# Patient Record
Sex: MALE | Race: WHITE | NOT HISPANIC OR LATINO | Employment: STUDENT | URBAN - METROPOLITAN AREA
[De-identification: names, ages, dates, MRNs, and addresses within clinical notes are randomized per-mention and may not be internally consistent; named-entity substitution may affect disease eponyms.]

---

## 2018-07-09 ENCOUNTER — NON-PROVIDER VISIT (OUTPATIENT)
Dept: OCCUPATIONAL MEDICINE | Facility: CLINIC | Age: 23
End: 2018-07-09

## 2018-07-09 DIAGNOSIS — Z02.1 PRE-EMPLOYMENT DRUG SCREENING: ICD-10-CM

## 2018-07-09 DIAGNOSIS — Z02.89 ENCOUNTER FOR OCCUPATIONAL HEALTH ASSESSMENT: ICD-10-CM

## 2018-07-09 LAB
AMP AMPHETAMINE: NORMAL
BAR BARBITURATES: NORMAL
BZO BENZODIAZEPINES: NORMAL
COC COCAINE: NORMAL
INT CON NEG: NORMAL
INT CON POS: NORMAL
MDMA ECSTASY: NORMAL
MET METHAMPHETAMINES: NORMAL
MTD METHADONE: NORMAL
OPI OPIATES: NORMAL
OXY OXYCODONE: NORMAL
PCP PHENCYCLIDINE: NORMAL
POC URINE DRUG SCREEN OCDRS: NORMAL
THC: NORMAL

## 2018-07-09 PROCEDURE — 80305 DRUG TEST PRSMV DIR OPT OBS: CPT | Performed by: PREVENTIVE MEDICINE

## 2018-07-09 PROCEDURE — 86580 TB INTRADERMAL TEST: CPT | Performed by: PREVENTIVE MEDICINE

## 2018-07-11 ENCOUNTER — NON-PROVIDER VISIT (OUTPATIENT)
Dept: OCCUPATIONAL MEDICINE | Facility: CLINIC | Age: 23
End: 2018-07-11

## 2018-07-11 ENCOUNTER — APPOINTMENT (OUTPATIENT)
Dept: RADIOLOGY | Facility: IMAGING CENTER | Age: 23
End: 2018-07-11
Attending: PREVENTIVE MEDICINE
Payer: COMMERCIAL

## 2018-07-11 ENCOUNTER — OFFICE VISIT (OUTPATIENT)
Dept: OCCUPATIONAL MEDICINE | Facility: CLINIC | Age: 23
End: 2018-07-11

## 2018-07-11 DIAGNOSIS — R76.11 POSITIVE PPD: ICD-10-CM

## 2018-07-11 LAB — TB WHEAL 3D P 5 TU DIAM: NORMAL MM

## 2018-07-11 PROCEDURE — 99211 OFF/OP EST MAY X REQ PHY/QHP: CPT | Performed by: PREVENTIVE MEDICINE

## 2018-07-11 PROCEDURE — 71045 X-RAY EXAM CHEST 1 VIEW: CPT | Mod: TC | Performed by: PREVENTIVE MEDICINE

## 2018-10-01 ENCOUNTER — IMMUNIZATION (OUTPATIENT)
Dept: OCCUPATIONAL MEDICINE | Facility: CLINIC | Age: 23
End: 2018-10-01

## 2018-10-01 DIAGNOSIS — Z23 NEED FOR VACCINATION: ICD-10-CM

## 2018-10-01 PROCEDURE — 90686 IIV4 VACC NO PRSV 0.5 ML IM: CPT | Performed by: PREVENTIVE MEDICINE

## 2019-09-30 ENCOUNTER — IMMUNIZATION (OUTPATIENT)
Dept: OCCUPATIONAL MEDICINE | Facility: CLINIC | Age: 24
End: 2019-09-30

## 2019-09-30 DIAGNOSIS — Z23 NEED FOR VACCINATION: ICD-10-CM

## 2019-09-30 PROCEDURE — 90686 IIV4 VACC NO PRSV 0.5 ML IM: CPT | Performed by: PREVENTIVE MEDICINE

## 2019-11-30 ENCOUNTER — APPOINTMENT (OUTPATIENT)
Dept: RADIOLOGY | Facility: MEDICAL CENTER | Age: 24
End: 2019-11-30
Attending: EMERGENCY MEDICINE
Payer: COMMERCIAL

## 2019-11-30 ENCOUNTER — HOSPITAL ENCOUNTER (EMERGENCY)
Facility: MEDICAL CENTER | Age: 24
End: 2019-11-30
Attending: EMERGENCY MEDICINE
Payer: COMMERCIAL

## 2019-11-30 VITALS
WEIGHT: 156.53 LBS | TEMPERATURE: 97.7 F | BODY MASS INDEX: 20.09 KG/M2 | SYSTOLIC BLOOD PRESSURE: 136 MMHG | OXYGEN SATURATION: 100 % | RESPIRATION RATE: 22 BRPM | DIASTOLIC BLOOD PRESSURE: 82 MMHG | HEIGHT: 74 IN | HEART RATE: 84 BPM

## 2019-11-30 DIAGNOSIS — N20.1 URETEROLITHIASIS: ICD-10-CM

## 2019-11-30 LAB
ALBUMIN SERPL BCP-MCNC: 5.1 G/DL (ref 3.2–4.9)
ALBUMIN/GLOB SERPL: 2.1 G/DL
ALP SERPL-CCNC: 60 U/L (ref 30–99)
ALT SERPL-CCNC: 10 U/L (ref 2–50)
ANION GAP SERPL CALC-SCNC: 14 MMOL/L (ref 0–11.9)
APPEARANCE UR: CLEAR
AST SERPL-CCNC: 13 U/L (ref 12–45)
BACTERIA #/AREA URNS HPF: NEGATIVE /HPF
BASOPHILS # BLD AUTO: 0.3 % (ref 0–1.8)
BASOPHILS # BLD: 0.03 K/UL (ref 0–0.12)
BILIRUB SERPL-MCNC: 2.1 MG/DL (ref 0.1–1.5)
BILIRUB UR QL STRIP.AUTO: ABNORMAL
BUN SERPL-MCNC: 15 MG/DL (ref 8–22)
CALCIUM SERPL-MCNC: 10 MG/DL (ref 8.5–10.5)
CHLORIDE SERPL-SCNC: 106 MMOL/L (ref 96–112)
CO2 SERPL-SCNC: 22 MMOL/L (ref 20–33)
COLOR UR: ABNORMAL
CREAT SERPL-MCNC: 1.28 MG/DL (ref 0.5–1.4)
EOSINOPHIL # BLD AUTO: 0.02 K/UL (ref 0–0.51)
EOSINOPHIL NFR BLD: 0.2 % (ref 0–6.9)
EPI CELLS #/AREA URNS HPF: NEGATIVE /HPF
ERYTHROCYTE [DISTWIDTH] IN BLOOD BY AUTOMATED COUNT: 38.1 FL (ref 35.9–50)
GLOBULIN SER CALC-MCNC: 2.4 G/DL (ref 1.9–3.5)
GLUCOSE SERPL-MCNC: 93 MG/DL (ref 65–99)
GLUCOSE UR STRIP.AUTO-MCNC: NEGATIVE MG/DL
HCT VFR BLD AUTO: 47.3 % (ref 42–52)
HGB BLD-MCNC: 16.1 G/DL (ref 14–18)
HYALINE CASTS #/AREA URNS LPF: ABNORMAL /LPF
IMM GRANULOCYTES # BLD AUTO: 0.03 K/UL (ref 0–0.11)
IMM GRANULOCYTES NFR BLD AUTO: 0.3 % (ref 0–0.9)
KETONES UR STRIP.AUTO-MCNC: >=160 MG/DL
LEUKOCYTE ESTERASE UR QL STRIP.AUTO: ABNORMAL
LIPASE SERPL-CCNC: 14 U/L (ref 11–82)
LYMPHOCYTES # BLD AUTO: 1.08 K/UL (ref 1–4.8)
LYMPHOCYTES NFR BLD: 11.2 % (ref 22–41)
MCH RBC QN AUTO: 29.1 PG (ref 27–33)
MCHC RBC AUTO-ENTMCNC: 34 G/DL (ref 33.7–35.3)
MCV RBC AUTO: 85.5 FL (ref 81.4–97.8)
MICRO URNS: ABNORMAL
MONOCYTES # BLD AUTO: 0.75 K/UL (ref 0–0.85)
MONOCYTES NFR BLD AUTO: 7.8 % (ref 0–13.4)
NEUTROPHILS # BLD AUTO: 7.74 K/UL (ref 1.82–7.42)
NEUTROPHILS NFR BLD: 80.2 % (ref 44–72)
NITRITE UR QL STRIP.AUTO: NEGATIVE
NRBC # BLD AUTO: 0 K/UL
NRBC BLD-RTO: 0 /100 WBC
PH UR STRIP.AUTO: 7 [PH] (ref 5–8)
PLATELET # BLD AUTO: 263 K/UL (ref 164–446)
PMV BLD AUTO: 10.6 FL (ref 9–12.9)
POTASSIUM SERPL-SCNC: 3.3 MMOL/L (ref 3.6–5.5)
PROT SERPL-MCNC: 7.5 G/DL (ref 6–8.2)
PROT UR QL STRIP: 100 MG/DL
RBC # BLD AUTO: 5.53 M/UL (ref 4.7–6.1)
RBC # URNS HPF: ABNORMAL /HPF
RBC UR QL AUTO: ABNORMAL
SODIUM SERPL-SCNC: 142 MMOL/L (ref 135–145)
SP GR UR STRIP.AUTO: 1.04
UROBILINOGEN UR STRIP.AUTO-MCNC: 1 MG/DL
WBC # BLD AUTO: 9.7 K/UL (ref 4.8–10.8)
WBC #/AREA URNS HPF: ABNORMAL /HPF

## 2019-11-30 PROCEDURE — 700102 HCHG RX REV CODE 250 W/ 637 OVERRIDE(OP): Performed by: EMERGENCY MEDICINE

## 2019-11-30 PROCEDURE — 700111 HCHG RX REV CODE 636 W/ 250 OVERRIDE (IP): Performed by: EMERGENCY MEDICINE

## 2019-11-30 PROCEDURE — 700105 HCHG RX REV CODE 258: Performed by: EMERGENCY MEDICINE

## 2019-11-30 PROCEDURE — A9270 NON-COVERED ITEM OR SERVICE: HCPCS | Performed by: EMERGENCY MEDICINE

## 2019-11-30 PROCEDURE — 81001 URINALYSIS AUTO W/SCOPE: CPT

## 2019-11-30 PROCEDURE — 80053 COMPREHEN METABOLIC PANEL: CPT

## 2019-11-30 PROCEDURE — 96365 THER/PROPH/DIAG IV INF INIT: CPT

## 2019-11-30 PROCEDURE — 85025 COMPLETE CBC W/AUTO DIFF WBC: CPT

## 2019-11-30 PROCEDURE — 99285 EMERGENCY DEPT VISIT HI MDM: CPT

## 2019-11-30 PROCEDURE — 83690 ASSAY OF LIPASE: CPT

## 2019-11-30 PROCEDURE — 74176 CT ABD & PELVIS W/O CONTRAST: CPT

## 2019-11-30 PROCEDURE — 96375 TX/PRO/DX INJ NEW DRUG ADDON: CPT

## 2019-11-30 RX ORDER — TAMSULOSIN HYDROCHLORIDE 0.4 MG/1
0.4 CAPSULE ORAL DAILY
Qty: 14 CAP | Refills: 0 | Status: SHIPPED | OUTPATIENT
Start: 2019-11-30 | End: 2019-12-14

## 2019-11-30 RX ORDER — NAPROXEN 500 MG/1
500 TABLET ORAL 2 TIMES DAILY PRN
Qty: 14 TAB | Refills: 0 | Status: SHIPPED | OUTPATIENT
Start: 2019-11-30 | End: 2019-12-07

## 2019-11-30 RX ORDER — TAMSULOSIN HYDROCHLORIDE 0.4 MG/1
0.4 CAPSULE ORAL DAILY
Qty: 14 CAP | Refills: 0 | Status: SHIPPED | OUTPATIENT
Start: 2019-11-30 | End: 2019-11-30 | Stop reason: SDUPTHER

## 2019-11-30 RX ORDER — NAPROXEN 500 MG/1
500 TABLET ORAL 2 TIMES DAILY PRN
Qty: 14 TAB | Refills: 0 | Status: SHIPPED | OUTPATIENT
Start: 2019-11-30 | End: 2019-11-30 | Stop reason: SDUPTHER

## 2019-11-30 RX ORDER — ONDANSETRON 4 MG/1
4 TABLET, ORALLY DISINTEGRATING ORAL EVERY 8 HOURS PRN
Qty: 8 TAB | Refills: 0 | Status: ON HOLD | OUTPATIENT
Start: 2019-11-30 | End: 2020-07-03

## 2019-11-30 RX ORDER — MORPHINE SULFATE 4 MG/ML
4 INJECTION, SOLUTION INTRAMUSCULAR; INTRAVENOUS ONCE
Status: COMPLETED | OUTPATIENT
Start: 2019-11-30 | End: 2019-11-30

## 2019-11-30 RX ORDER — HYDROCODONE BITARTRATE AND ACETAMINOPHEN 5; 325 MG/1; MG/1
1 TABLET ORAL EVERY 4 HOURS PRN
Qty: 12 TAB | Refills: 0 | Status: SHIPPED | OUTPATIENT
Start: 2019-11-30 | End: 2019-12-03

## 2019-11-30 RX ORDER — ONDANSETRON 4 MG/1
4 TABLET, ORALLY DISINTEGRATING ORAL EVERY 8 HOURS PRN
Qty: 8 TAB | Refills: 0 | Status: SHIPPED | OUTPATIENT
Start: 2019-11-30 | End: 2019-11-30 | Stop reason: SDUPTHER

## 2019-11-30 RX ORDER — HYDROCODONE BITARTRATE AND ACETAMINOPHEN 5; 325 MG/1; MG/1
1 TABLET ORAL EVERY 4 HOURS PRN
Qty: 12 TAB | Refills: 0 | Status: SHIPPED | OUTPATIENT
Start: 2019-11-30 | End: 2019-11-30 | Stop reason: SDUPTHER

## 2019-11-30 RX ORDER — CEPHALEXIN 500 MG/1
1000 CAPSULE ORAL 2 TIMES DAILY
Qty: 28 CAP | Refills: 0 | Status: SHIPPED | OUTPATIENT
Start: 2019-11-30 | End: 2019-11-30 | Stop reason: SDUPTHER

## 2019-11-30 RX ORDER — ACETAMINOPHEN 325 MG/1
650 TABLET ORAL ONCE
Status: COMPLETED | OUTPATIENT
Start: 2019-11-30 | End: 2019-11-30

## 2019-11-30 RX ORDER — CEPHALEXIN 500 MG/1
1000 CAPSULE ORAL 2 TIMES DAILY
Qty: 28 CAP | Refills: 0 | Status: SHIPPED | OUTPATIENT
Start: 2019-11-30 | End: 2019-12-07

## 2019-11-30 RX ADMIN — ACETAMINOPHEN 650 MG: 325 TABLET, FILM COATED ORAL at 14:56

## 2019-11-30 RX ADMIN — CEFTRIAXONE SODIUM 2 G: 2 INJECTION, POWDER, FOR SOLUTION INTRAMUSCULAR; INTRAVENOUS at 17:15

## 2019-11-30 RX ADMIN — MORPHINE SULFATE 4 MG: 4 INJECTION INTRAVENOUS at 17:21

## 2019-11-30 NOTE — ED PROVIDER NOTES
ED Provider Note    Scribed for Nathan Jara M.D. by Huma Jorgensen. 11/30/2019, 2:40 PM.    Primary care provider: Pcp Pt States None  Means of arrival: Walk in  History obtained from: Patient   History limited by: None    CHIEF COMPLAINT  Chief Complaint   Patient presents with   • Flank Pain       HPI  Two EDBran is a 24 y.o. male who presents to the Emergency Department for evaluation of right abdominal pain that radiates to the right flank that began today at 10:30 AM. At onset pain was 8/10 but at time of exam patient describes pain as 4/10. Patient reports he woke up with the pain and tried to use the restroom to no alleviation of the pain. He reports the urine was very dark but does not know if there was blood. He tried to take a warm shower for his pain but experienced sensations of tingling in his abdomen bilateral arms and hands and muscles twitching around his eyes. Patient complains of additional symptoms of nausea. He denies any fever. Patient reports an identical episode of pain on Monday but those symptoms were alleviated with 800 mg ibuprofen. He denies a history of kidney stones.     REVIEW OF SYSTEMS  Pertinent positives include abdominal pain, right flank pain, dark urine, sensations of tingling in his abdomen bilateral arms and hands and muscles twitching around his eyes, nausea. Pertinent negatives include no fever.  All other systems reviewed and negative.    PAST MEDICAL HISTORY   No pertinent history noted.     SURGICAL HISTORY  patient denies any surgical history    SOCIAL HISTORY  Social History     Tobacco Use   • Smoking status: Never Smoker   • Smokeless tobacco: Never Used   Substance Use Topics   • Alcohol use: Not Currently   • Drug use: Never      Social History     Substance and Sexual Activity   Drug Use Never       FAMILY HISTORY  History reviewed. No pertinent family history.    CURRENT MEDICATIONS  Home Medications     Reviewed by Mary Anne Kuhn R.N. (Registered Nurse) on  "11/30/19 at 1307  Med List Status: Partial   Medication Last Dose Status        Patient Antoni Taking any Medications                       ALLERGIES  No Known Allergies    PHYSICAL EXAM  VITAL SIGNS: /82   Pulse 84   Temp 36.5 °C (97.7 °F) (Oral)   Resp (!) 22   Ht 1.88 m (6' 2\")   Wt 71 kg (156 lb 8.4 oz)   SpO2 100%   BMI 20.10 kg/m²     Constitutional: Well developed, Well nourished, No acute distress, Non-toxic appearance.   HENT: Normocephalic, Atraumatic, TMs normal, mucous membranes moist, no erythema, exudates, swelling, or masses, nares patent  Eyes: nonicteric  Neck: Supple, no meningismus  Lymphatic: No lymphadenopathy noted.   Cardiovascular: Regular rate and rhythm, no gallops rubs or murmurs  Lungs: Clear bilaterally   Abdomen: Bowel sounds normal, Soft, No tenderness  Skin: Warm, Dry, no rash  Back: No tenderness, No CVA tenderness.   Genitalia: Testicles normal in size and shape, no tenderness, erythema, or discharge.   Rectal: Deferred  Extremities: No edema  Neurologic: Alert, appropriate, follows commands, moving all extremities, normal speech   Psychiatric: Affect normal    DIAGNOSTIC STUDIES / PROCEDURES    LABS  Results for orders placed or performed during the hospital encounter of 11/30/19   CBC WITH DIFFERENTIAL   Result Value Ref Range    WBC 9.7 4.8 - 10.8 K/uL    RBC 5.53 4.70 - 6.10 M/uL    Hemoglobin 16.1 14.0 - 18.0 g/dL    Hematocrit 47.3 42.0 - 52.0 %    MCV 85.5 81.4 - 97.8 fL    MCH 29.1 27.0 - 33.0 pg    MCHC 34.0 33.7 - 35.3 g/dL    RDW 38.1 35.9 - 50.0 fL    Platelet Count 263 164 - 446 K/uL    MPV 10.6 9.0 - 12.9 fL    Neutrophils-Polys 80.20 (H) 44.00 - 72.00 %    Lymphocytes 11.20 (L) 22.00 - 41.00 %    Monocytes 7.80 0.00 - 13.40 %    Eosinophils 0.20 0.00 - 6.90 %    Basophils 0.30 0.00 - 1.80 %    Immature Granulocytes 0.30 0.00 - 0.90 %    Nucleated RBC 0.00 /100 WBC    Neutrophils (Absolute) 7.74 (H) 1.82 - 7.42 K/uL    Lymphs (Absolute) 1.08 1.00 - 4.80 " K/uL    Monos (Absolute) 0.75 0.00 - 0.85 K/uL    Eos (Absolute) 0.02 0.00 - 0.51 K/uL    Baso (Absolute) 0.03 0.00 - 0.12 K/uL    Immature Granulocytes (abs) 0.03 0.00 - 0.11 K/uL    NRBC (Absolute) 0.00 K/uL   COMP METABOLIC PANEL   Result Value Ref Range    Sodium 142 135 - 145 mmol/L    Potassium 3.3 (L) 3.6 - 5.5 mmol/L    Chloride 106 96 - 112 mmol/L    Co2 22 20 - 33 mmol/L    Anion Gap 14.0 (H) 0.0 - 11.9    Glucose 93 65 - 99 mg/dL    Bun 15 8 - 22 mg/dL    Creatinine 1.28 0.50 - 1.40 mg/dL    Calcium 10.0 8.5 - 10.5 mg/dL    AST(SGOT) 13 12 - 45 U/L    ALT(SGPT) 10 2 - 50 U/L    Alkaline Phosphatase 60 30 - 99 U/L    Total Bilirubin 2.1 (H) 0.1 - 1.5 mg/dL    Albumin 5.1 (H) 3.2 - 4.9 g/dL    Total Protein 7.5 6.0 - 8.2 g/dL    Globulin 2.4 1.9 - 3.5 g/dL    A-G Ratio 2.1 g/dL   LIPASE   Result Value Ref Range    Lipase 14 11 - 82 U/L   URINALYSIS,CULTURE IF INDICATED   Result Value Ref Range    Color DK Yellow     Character Clear     Specific Gravity 1.037 <1.035    Ph 7.0 5.0 - 8.0    Glucose Negative Negative mg/dL    Ketones >=160 Negative mg/dL    Protein 100 (A) Negative mg/dL    Bilirubin Small (A) Negative    Urobilinogen, Urine 1.0 Negative    Nitrite Negative Negative    Leukocyte Esterase Trace (A) Negative    Occult Blood Moderate (A) Negative    Micro Urine Req Microscopic    ESTIMATED GFR   Result Value Ref Range    GFR If African American >60 >60 mL/min/1.73 m 2    GFR If Non African American >60 >60 mL/min/1.73 m 2   URINE MICROSCOPIC (W/UA)   Result Value Ref Range    WBC 2-5 (A) /hpf    RBC  (A) /hpf    Bacteria Negative None /hpf    Epithelial Cells Negative /hpf    Hyaline Cast 3-5 (A) /lpf     All labs reviewed by me.       RADIOLOGY  CT-RENAL COLIC EVALUATION(A/P W/O)   Final Result      5 mm right mid to proximal ureteral stone with mild hydronephrosis seen above that level.        The radiologist's interpretation of all radiological studies have been reviewed by me.    COURSE  & MEDICAL DECISION MAKING  Nursing notes, VS, PMSFHx reviewed in chart.     2:40 PM Patient seen and examined at bedside. Ordered for CT Renal colic, Estimated GFR, CBC w/ differentials, CMP, Lipase, Urinalysis to evaluate. Patient was treated with tylenol 650 mg for his symptoms.     4:24 PM Patient was reevaluated at bedside. Discussed lab and radiology  results with the patient and informed them that he has a kidney stone. I let him know I will have urology follow up with him.      5:05 PM Paged Urology.     5:09 PM I discussed the patient's case and the above findings with Dr. Jerez (Urology) who agreed to schedule a follow up with the patient.     5:10 PM Informed patient that Dr. Jerez agreed to follow up with him. Informed him I will prescribe him pain medication and Zofran for his symptoms and that he may be discharged at this time. Patient verbalize agreement to the plan for care.       Decision Making:  This is a 24 y.o. year old male who presents with right abdominal and flank pain.  The patient has evidence of a proximal right 5 mm ureteral stone with moderate hydro-.  Patient has a few whites in the urine but predominately red cells and no bacteria.  I will cover with antibiotics.  The case was discussed with Dr. Jerez who is on-call for urology and will follow up with the patient early this week.  Otherwise the patient appears very comfortable and nontoxic.  Patient has no leukocytosis and is afebrile.    I reviewed prescription monitoring program for patient's narcotic use before prescribing a scheduled drug.The patient will not drink alcohol nor drive with prescribed medications. The patient will return for new or worsening symptoms and is stable at the time of discharge.    The patient is referred to a primary physician for blood pressure management, diabetic screening, and for all other preventative health concerns.    In prescribing controlled substances to this patient, I certify that I have  obtained and reviewed the medical history of Two EDArion. I have also made a good ricardo effort to obtain applicable records from other providers who have treated the patient and records did not demonstrate any increased risk of substance abuse that would prevent me from prescribing controlled substances.     I have conducted a physical exam and documented it. I have reviewed Mr. Zepeda’s prescription history as maintained by the [place] Prescription Monitoring Program.     I have assessed the patient’s risk for abuse, dependency, and addiction using the validated Opioid Risk Tool available at https://www.mdcalc.com/ghcfic-yuse-lxii-ort-narcotic-abuse.     Given the above, I believe the benefits of controlled substance therapy outweigh the risks. The reasons for prescribing controlled substances include non-narcotic, oral analgesic alternatives have been inadequate for pain control. Accordingly, I have discussed the risk and benefits, treatment plan, and alternative therapies with the patient.     DISPOSITION:  Patient will be discharged home in stable condition.    FOLLOW UP:  Bennett Jerez M.D.  [address] [place] [id number] [phone]    Schedule an appointment as soon as possible for a visit in 1 day        OUTPATIENT MEDICATIONS:  New Prescriptions    CEPHALEXIN (KEFLEX) 500 MG CAP    Take 2 Caps by mouth 2 times a day for 7 days.    HYDROCODONE-ACETAMINOPHEN (NORCO) 5-325 MG TAB PER TABLET    Take 1 Tab by mouth every four hours as needed for up to 3 days.    NAPROXEN (NAPROSYN) 500 MG TAB    Take 1 Tab by mouth 2 times a day as needed (nausea) for up to 7 days.    ONDANSETRON (ZOFRAN ODT) 4 MG TABLET DISPERSIBLE    Take 1 Tab by mouth every 8 hours as needed for Nausea.    TAMSULOSIN (FLOMAX) 0.4 MG CAPSULE    Take 1 Cap by mouth every day for 14 days.       DISPOSITION:  Patient will be discharged home in stable condition.    FOLLOW UP:  Bennett Jerez M.D.  [address]  [id number]  [phone]    Schedule an appointment as soon as possible for a visit in 1 day        OUTPATIENT MEDICATIONS:  New Prescriptions    CEPHALEXIN (KEFLEX) 500 MG CAP    Take 2 Caps by mouth 2 times a day for 7 days.    HYDROCODONE-ACETAMINOPHEN (NORCO) 5-325 MG TAB PER TABLET    Take 1 Tab by mouth every four hours as needed for up to 3 days.    NAPROXEN (NAPROSYN) 500 MG TAB    Take 1 Tab by mouth 2 times a day as needed (nausea) for up to 7 days.    ONDANSETRON (ZOFRAN ODT) 4 MG TABLET DISPERSIBLE    Take 1 Tab by mouth every 8 hours as needed for Nausea.    TAMSULOSIN (FLOMAX) 0.4 MG CAPSULE    Take 1 Cap by mouth every day for 14 days.       FINAL IMPRESSION  1. Ureterolithiasis          Huma MAGALLON (James), am scribing for, and in the presence of, Nathan Jara M.D..    Electronically signed by: Huma Mckeon), 11/30/2019    Nathan MAGALLON M.D. personally performed the services described in this documentation, as scribed by Huma Jorgensen in my presence, and it is both accurate and complete.    C    The note accurately reflects work and decisions made by me.  Nathan Jara  11/30/2019  5:18 PM

## 2019-11-30 NOTE — ED TRIAGE NOTES
Chief Complaint   Patient presents with   • Flank Pain   Pt to triage in [facility].  Pt reports acute onset of 9/10 right flank pain starting 1030 AM.  Pt additionally reports tingling in bilateral hands and eyes feel twitchy.  Pt educated on triage process and instructed to notify triage RN of any change in status.

## 2019-12-01 NOTE — ED NOTES
Discussed Discharge instructions, F/U instructions, and medication instructions with Pt. Rx given, controlled substance informed consent reviewed and signed, questions answered. Pt escorted out of ED in stable condition.

## 2019-12-01 NOTE — DISCHARGE INSTRUCTIONS
Return for progressive pain, any persistent vomiting fever or other concerns.  Please call on Monday for follow-up with urology.

## 2020-07-01 ENCOUNTER — HOSPITAL ENCOUNTER (INPATIENT)
Facility: MEDICAL CENTER | Age: 25
LOS: 1 days | DRG: 660 | End: 2020-07-03
Attending: EMERGENCY MEDICINE | Admitting: INTERNAL MEDICINE
Payer: COMMERCIAL

## 2020-07-01 DIAGNOSIS — N20.1 URETEROLITHIASIS: ICD-10-CM

## 2020-07-01 DIAGNOSIS — N20.0 RECURRENT KIDNEY STONES: ICD-10-CM

## 2020-07-01 PROCEDURE — 99285 EMERGENCY DEPT VISIT HI MDM: CPT

## 2020-07-01 PROCEDURE — 85025 COMPLETE CBC W/AUTO DIFF WBC: CPT

## 2020-07-01 PROCEDURE — 80053 COMPREHEN METABOLIC PANEL: CPT

## 2020-07-01 PROCEDURE — 83970 ASSAY OF PARATHORMONE: CPT

## 2020-07-01 PROCEDURE — 96375 TX/PRO/DX INJ NEW DRUG ADDON: CPT

## 2020-07-01 PROCEDURE — 82330 ASSAY OF CALCIUM: CPT

## 2020-07-01 PROCEDURE — 96374 THER/PROPH/DIAG INJ IV PUSH: CPT

## 2020-07-01 ASSESSMENT — FIBROSIS 4 INDEX: FIB4 SCORE: 0.36

## 2020-07-02 ENCOUNTER — ANESTHESIA (OUTPATIENT)
Dept: SURGERY | Facility: MEDICAL CENTER | Age: 25
DRG: 660 | End: 2020-07-02
Payer: COMMERCIAL

## 2020-07-02 ENCOUNTER — ANESTHESIA EVENT (OUTPATIENT)
Dept: SURGERY | Facility: MEDICAL CENTER | Age: 25
DRG: 660 | End: 2020-07-02
Payer: COMMERCIAL

## 2020-07-02 ENCOUNTER — APPOINTMENT (OUTPATIENT)
Dept: RADIOLOGY | Facility: MEDICAL CENTER | Age: 25
DRG: 660 | End: 2020-07-02
Attending: UROLOGY
Payer: COMMERCIAL

## 2020-07-02 ENCOUNTER — APPOINTMENT (OUTPATIENT)
Dept: RADIOLOGY | Facility: MEDICAL CENTER | Age: 25
DRG: 660 | End: 2020-07-02
Attending: EMERGENCY MEDICINE
Payer: COMMERCIAL

## 2020-07-02 PROBLEM — N13.2 HYDRONEPHROSIS WITH URINARY OBSTRUCTION DUE TO URETERAL CALCULUS: Status: ACTIVE | Noted: 2020-07-02

## 2020-07-02 PROBLEM — E87.1 HYPONATREMIA: Status: ACTIVE | Noted: 2020-07-02

## 2020-07-02 LAB
ALBUMIN SERPL BCP-MCNC: 4.7 G/DL (ref 3.2–4.9)
ALBUMIN/GLOB SERPL: 1.7 G/DL
ALP SERPL-CCNC: 92 U/L (ref 30–99)
ALT SERPL-CCNC: 11 U/L (ref 2–50)
AMORPH CRY #/AREA URNS HPF: PRESENT /HPF
ANION GAP SERPL CALC-SCNC: 13 MMOL/L (ref 7–16)
APPEARANCE UR: ABNORMAL
AST SERPL-CCNC: 22 U/L (ref 12–45)
BASOPHILS # BLD AUTO: 0.3 % (ref 0–1.8)
BASOPHILS # BLD: 0.03 K/UL (ref 0–0.12)
BILIRUB SERPL-MCNC: 0.6 MG/DL (ref 0.1–1.5)
BILIRUB UR QL STRIP.AUTO: ABNORMAL
BUN SERPL-MCNC: 14 MG/DL (ref 8–22)
CA-I SERPL-SCNC: 1.1 MMOL/L (ref 1.1–1.3)
CALCIUM SERPL-MCNC: 9.1 MG/DL (ref 8.5–10.5)
CHLORIDE SERPL-SCNC: 99 MMOL/L (ref 96–112)
CO2 SERPL-SCNC: 22 MMOL/L (ref 20–33)
COLOR UR: YELLOW
COVID ORDER STATUS COVID19: NORMAL
CREAT SERPL-MCNC: 1.35 MG/DL (ref 0.5–1.4)
EOSINOPHIL # BLD AUTO: 0.07 K/UL (ref 0–0.51)
EOSINOPHIL NFR BLD: 0.7 % (ref 0–6.9)
ERYTHROCYTE [DISTWIDTH] IN BLOOD BY AUTOMATED COUNT: 42.9 FL (ref 35.9–50)
GLOBULIN SER CALC-MCNC: 2.7 G/DL (ref 1.9–3.5)
GLUCOSE SERPL-MCNC: 107 MG/DL (ref 65–99)
GLUCOSE UR STRIP.AUTO-MCNC: NEGATIVE MG/DL
HCT VFR BLD AUTO: 47 % (ref 42–52)
HGB BLD-MCNC: 15.2 G/DL (ref 14–18)
IMM GRANULOCYTES # BLD AUTO: 0.02 K/UL (ref 0–0.11)
IMM GRANULOCYTES NFR BLD AUTO: 0.2 % (ref 0–0.9)
KETONES UR STRIP.AUTO-MCNC: ABNORMAL MG/DL
LEUKOCYTE ESTERASE UR QL STRIP.AUTO: NEGATIVE
LYMPHOCYTES # BLD AUTO: 1.51 K/UL (ref 1–4.8)
LYMPHOCYTES NFR BLD: 15.2 % (ref 22–41)
MCH RBC QN AUTO: 29.2 PG (ref 27–33)
MCHC RBC AUTO-ENTMCNC: 32.3 G/DL (ref 33.7–35.3)
MCV RBC AUTO: 90.4 FL (ref 81.4–97.8)
MICRO URNS: ABNORMAL
MONOCYTES # BLD AUTO: 0.59 K/UL (ref 0–0.85)
MONOCYTES NFR BLD AUTO: 5.9 % (ref 0–13.4)
NEUTROPHILS # BLD AUTO: 7.73 K/UL (ref 1.82–7.42)
NEUTROPHILS NFR BLD: 77.7 % (ref 44–72)
NITRITE UR QL STRIP.AUTO: NEGATIVE
NRBC # BLD AUTO: 0 K/UL
NRBC BLD-RTO: 0 /100 WBC
PH UR STRIP.AUTO: 6 [PH] (ref 5–8)
PLATELET # BLD AUTO: 251 K/UL (ref 164–446)
PMV BLD AUTO: 10.6 FL (ref 9–12.9)
POTASSIUM SERPL-SCNC: 3.7 MMOL/L (ref 3.6–5.5)
PROT SERPL-MCNC: 7.4 G/DL (ref 6–8.2)
PROT UR QL STRIP: 30 MG/DL
PTH-INTACT SERPL-MCNC: 48.7 PG/ML (ref 14–72)
RBC # BLD AUTO: 5.2 M/UL (ref 4.7–6.1)
RBC UR QL AUTO: ABNORMAL
SARS-COV-2 RDRP RESP QL NAA+PROBE: NOTDETECTED
SODIUM SERPL-SCNC: 134 MMOL/L (ref 135–145)
SP GR UR REFRACTOMETRY: 1.03
SPECIMEN SOURCE: NORMAL
UROBILINOGEN UR STRIP.AUTO-MCNC: 0.2 MG/DL
WBC # BLD AUTO: 10 K/UL (ref 4.8–10.8)

## 2020-07-02 PROCEDURE — 502240 HCHG MISC OR SUPPLY RC 0272: Performed by: UROLOGY

## 2020-07-02 PROCEDURE — 160009 HCHG ANES TIME/MIN: Performed by: UROLOGY

## 2020-07-02 PROCEDURE — A9270 NON-COVERED ITEM OR SERVICE: HCPCS | Performed by: UROLOGY

## 2020-07-02 PROCEDURE — 88300 SURGICAL PATH GROSS: CPT

## 2020-07-02 PROCEDURE — 160039 HCHG SURGERY MINUTES - EA ADDL 1 MIN LEVEL 3: Performed by: UROLOGY

## 2020-07-02 PROCEDURE — U0004 COV-19 TEST NON-CDC HGH THRU: HCPCS

## 2020-07-02 PROCEDURE — 99222 1ST HOSP IP/OBS MODERATE 55: CPT | Performed by: INTERNAL MEDICINE

## 2020-07-02 PROCEDURE — 160002 HCHG RECOVERY MINUTES (STAT): Performed by: UROLOGY

## 2020-07-02 PROCEDURE — 700105 HCHG RX REV CODE 258: Performed by: INTERNAL MEDICINE

## 2020-07-02 PROCEDURE — C9803 HOPD COVID-19 SPEC COLLECT: HCPCS | Performed by: PHYSICIAN ASSISTANT

## 2020-07-02 PROCEDURE — 700111 HCHG RX REV CODE 636 W/ 250 OVERRIDE (IP): Performed by: INTERNAL MEDICINE

## 2020-07-02 PROCEDURE — 160048 HCHG OR STATISTICAL LEVEL 1-5: Performed by: UROLOGY

## 2020-07-02 PROCEDURE — 700111 HCHG RX REV CODE 636 W/ 250 OVERRIDE (IP): Performed by: ANESTHESIOLOGY

## 2020-07-02 PROCEDURE — C2617 STENT, NON-COR, TEM W/O DEL: HCPCS | Performed by: UROLOGY

## 2020-07-02 PROCEDURE — A9270 NON-COVERED ITEM OR SERVICE: HCPCS | Performed by: STUDENT IN AN ORGANIZED HEALTH CARE EDUCATION/TRAINING PROGRAM

## 2020-07-02 PROCEDURE — 700102 HCHG RX REV CODE 250 W/ 637 OVERRIDE(OP): Performed by: UROLOGY

## 2020-07-02 PROCEDURE — 0TC68ZZ EXTIRPATION OF MATTER FROM RIGHT URETER, VIA NATURAL OR ARTIFICIAL OPENING ENDOSCOPIC: ICD-10-PCS | Performed by: UROLOGY

## 2020-07-02 PROCEDURE — BT1DZZZ FLUOROSCOPY OF RIGHT KIDNEY, URETER AND BLADDER: ICD-10-PCS | Performed by: RADIOLOGY

## 2020-07-02 PROCEDURE — 74176 CT ABD & PELVIS W/O CONTRAST: CPT

## 2020-07-02 PROCEDURE — C1769 GUIDE WIRE: HCPCS | Performed by: UROLOGY

## 2020-07-02 PROCEDURE — 160035 HCHG PACU - 1ST 60 MINS PHASE I: Performed by: UROLOGY

## 2020-07-02 PROCEDURE — 700117 HCHG RX CONTRAST REV CODE 255: Performed by: UROLOGY

## 2020-07-02 PROCEDURE — 81001 URINALYSIS AUTO W/SCOPE: CPT

## 2020-07-02 PROCEDURE — 700111 HCHG RX REV CODE 636 W/ 250 OVERRIDE (IP): Performed by: EMERGENCY MEDICINE

## 2020-07-02 PROCEDURE — 770006 HCHG ROOM/CARE - MED/SURG/GYN SEMI*

## 2020-07-02 PROCEDURE — 82365 CALCULUS SPECTROSCOPY: CPT

## 2020-07-02 PROCEDURE — 700101 HCHG RX REV CODE 250: Performed by: ANESTHESIOLOGY

## 2020-07-02 PROCEDURE — 700102 HCHG RX REV CODE 250 W/ 637 OVERRIDE(OP): Performed by: STUDENT IN AN ORGANIZED HEALTH CARE EDUCATION/TRAINING PROGRAM

## 2020-07-02 PROCEDURE — 0T768DZ DILATION OF RIGHT URETER WITH INTRALUMINAL DEVICE, VIA NATURAL OR ARTIFICIAL OPENING ENDOSCOPIC: ICD-10-PCS | Performed by: UROLOGY

## 2020-07-02 PROCEDURE — 501329 HCHG SET, CYSTO IRRIG Y TUR: Performed by: UROLOGY

## 2020-07-02 PROCEDURE — 160028 HCHG SURGERY MINUTES - 1ST 30 MINS LEVEL 3: Performed by: UROLOGY

## 2020-07-02 DEVICE — STENT UROLOGICAL POLARIS 6X26  ULTRA: Type: IMPLANTABLE DEVICE | Site: URETER | Status: FUNCTIONAL

## 2020-07-02 RX ORDER — ONDANSETRON 2 MG/ML
4 INJECTION INTRAMUSCULAR; INTRAVENOUS EVERY 4 HOURS PRN
Status: DISCONTINUED | OUTPATIENT
Start: 2020-07-02 | End: 2020-07-03 | Stop reason: HOSPADM

## 2020-07-02 RX ORDER — PHENAZOPYRIDINE HYDROCHLORIDE 100 MG/1
200 TABLET, FILM COATED ORAL
Status: DISCONTINUED | OUTPATIENT
Start: 2020-07-02 | End: 2020-07-03 | Stop reason: HOSPADM

## 2020-07-02 RX ORDER — LABETALOL HYDROCHLORIDE 5 MG/ML
5 INJECTION, SOLUTION INTRAVENOUS
Status: DISCONTINUED | OUTPATIENT
Start: 2020-07-02 | End: 2020-07-02 | Stop reason: HOSPADM

## 2020-07-02 RX ORDER — HYDROMORPHONE HYDROCHLORIDE 1 MG/ML
0.2 INJECTION, SOLUTION INTRAMUSCULAR; INTRAVENOUS; SUBCUTANEOUS
Status: DISCONTINUED | OUTPATIENT
Start: 2020-07-02 | End: 2020-07-02 | Stop reason: HOSPADM

## 2020-07-02 RX ORDER — KETOROLAC TROMETHAMINE 30 MG/ML
30 INJECTION, SOLUTION INTRAMUSCULAR; INTRAVENOUS EVERY 6 HOURS PRN
Status: DISCONTINUED | OUTPATIENT
Start: 2020-07-02 | End: 2020-07-03 | Stop reason: HOSPADM

## 2020-07-02 RX ORDER — HYDROMORPHONE HYDROCHLORIDE 1 MG/ML
0.1 INJECTION, SOLUTION INTRAMUSCULAR; INTRAVENOUS; SUBCUTANEOUS
Status: DISCONTINUED | OUTPATIENT
Start: 2020-07-02 | End: 2020-07-02 | Stop reason: HOSPADM

## 2020-07-02 RX ORDER — MORPHINE SULFATE 4 MG/ML
4 INJECTION, SOLUTION INTRAMUSCULAR; INTRAVENOUS
Status: DISCONTINUED | OUTPATIENT
Start: 2020-07-02 | End: 2020-07-03 | Stop reason: HOSPADM

## 2020-07-02 RX ORDER — SODIUM CHLORIDE 9 MG/ML
INJECTION, SOLUTION INTRAVENOUS CONTINUOUS
Status: DISCONTINUED | OUTPATIENT
Start: 2020-07-02 | End: 2020-07-03 | Stop reason: HOSPADM

## 2020-07-02 RX ORDER — CEFAZOLIN SODIUM 1 G/3ML
INJECTION, POWDER, FOR SOLUTION INTRAMUSCULAR; INTRAVENOUS PRN
Status: DISCONTINUED | OUTPATIENT
Start: 2020-07-02 | End: 2020-07-02 | Stop reason: SURG

## 2020-07-02 RX ORDER — POLYETHYLENE GLYCOL 3350 17 G/17G
1 POWDER, FOR SOLUTION ORAL
Status: DISCONTINUED | OUTPATIENT
Start: 2020-07-02 | End: 2020-07-03 | Stop reason: HOSPADM

## 2020-07-02 RX ORDER — TAMSULOSIN HYDROCHLORIDE 0.4 MG/1
0.4 CAPSULE ORAL ONCE
Status: COMPLETED | OUTPATIENT
Start: 2020-07-02 | End: 2020-07-02

## 2020-07-02 RX ORDER — PROCHLORPERAZINE EDISYLATE 5 MG/ML
5-10 INJECTION INTRAMUSCULAR; INTRAVENOUS EVERY 4 HOURS PRN
Status: DISCONTINUED | OUTPATIENT
Start: 2020-07-02 | End: 2020-07-03 | Stop reason: HOSPADM

## 2020-07-02 RX ORDER — HYDRALAZINE HYDROCHLORIDE 20 MG/ML
5 INJECTION INTRAMUSCULAR; INTRAVENOUS
Status: DISCONTINUED | OUTPATIENT
Start: 2020-07-02 | End: 2020-07-02 | Stop reason: HOSPADM

## 2020-07-02 RX ORDER — DEXAMETHASONE SODIUM PHOSPHATE 4 MG/ML
INJECTION, SOLUTION INTRA-ARTICULAR; INTRALESIONAL; INTRAMUSCULAR; INTRAVENOUS; SOFT TISSUE PRN
Status: DISCONTINUED | OUTPATIENT
Start: 2020-07-02 | End: 2020-07-02 | Stop reason: SURG

## 2020-07-02 RX ORDER — DIPHENHYDRAMINE HYDROCHLORIDE 50 MG/ML
12.5 INJECTION INTRAMUSCULAR; INTRAVENOUS
Status: DISCONTINUED | OUTPATIENT
Start: 2020-07-02 | End: 2020-07-02 | Stop reason: HOSPADM

## 2020-07-02 RX ORDER — MORPHINE SULFATE 4 MG/ML
4 INJECTION, SOLUTION INTRAMUSCULAR; INTRAVENOUS ONCE
Status: COMPLETED | OUTPATIENT
Start: 2020-07-02 | End: 2020-07-02

## 2020-07-02 RX ORDER — BISACODYL 10 MG
10 SUPPOSITORY, RECTAL RECTAL
Status: DISCONTINUED | OUTPATIENT
Start: 2020-07-02 | End: 2020-07-03 | Stop reason: HOSPADM

## 2020-07-02 RX ORDER — PROMETHAZINE HYDROCHLORIDE 25 MG/1
12.5-25 SUPPOSITORY RECTAL EVERY 4 HOURS PRN
Status: DISCONTINUED | OUTPATIENT
Start: 2020-07-02 | End: 2020-07-03 | Stop reason: HOSPADM

## 2020-07-02 RX ORDER — HYDROMORPHONE HYDROCHLORIDE 1 MG/ML
0.4 INJECTION, SOLUTION INTRAMUSCULAR; INTRAVENOUS; SUBCUTANEOUS
Status: DISCONTINUED | OUTPATIENT
Start: 2020-07-02 | End: 2020-07-02 | Stop reason: HOSPADM

## 2020-07-02 RX ORDER — PROMETHAZINE HYDROCHLORIDE 25 MG/1
12.5-25 TABLET ORAL EVERY 4 HOURS PRN
Status: DISCONTINUED | OUTPATIENT
Start: 2020-07-02 | End: 2020-07-03 | Stop reason: HOSPADM

## 2020-07-02 RX ORDER — MEPERIDINE HYDROCHLORIDE 25 MG/ML
12.5 INJECTION INTRAMUSCULAR; INTRAVENOUS; SUBCUTANEOUS
Status: DISCONTINUED | OUTPATIENT
Start: 2020-07-02 | End: 2020-07-02 | Stop reason: HOSPADM

## 2020-07-02 RX ORDER — OXYCODONE HYDROCHLORIDE 5 MG/1
5 TABLET ORAL
Status: DISCONTINUED | OUTPATIENT
Start: 2020-07-02 | End: 2020-07-03 | Stop reason: HOSPADM

## 2020-07-02 RX ORDER — ONDANSETRON 2 MG/ML
4 INJECTION INTRAMUSCULAR; INTRAVENOUS ONCE
Status: COMPLETED | OUTPATIENT
Start: 2020-07-02 | End: 2020-07-02

## 2020-07-02 RX ORDER — AMOXICILLIN 250 MG
2 CAPSULE ORAL 2 TIMES DAILY
Status: DISCONTINUED | OUTPATIENT
Start: 2020-07-02 | End: 2020-07-03 | Stop reason: HOSPADM

## 2020-07-02 RX ORDER — OXYCODONE HCL 5 MG/5 ML
10 SOLUTION, ORAL ORAL
Status: DISCONTINUED | OUTPATIENT
Start: 2020-07-02 | End: 2020-07-02 | Stop reason: HOSPADM

## 2020-07-02 RX ORDER — SODIUM CHLORIDE, SODIUM LACTATE, POTASSIUM CHLORIDE, CALCIUM CHLORIDE 600; 310; 30; 20 MG/100ML; MG/100ML; MG/100ML; MG/100ML
INJECTION, SOLUTION INTRAVENOUS CONTINUOUS
Status: DISCONTINUED | OUTPATIENT
Start: 2020-07-02 | End: 2020-07-02 | Stop reason: HOSPADM

## 2020-07-02 RX ORDER — ACETAMINOPHEN 325 MG/1
650 TABLET ORAL EVERY 6 HOURS PRN
Status: DISCONTINUED | OUTPATIENT
Start: 2020-07-02 | End: 2020-07-03 | Stop reason: HOSPADM

## 2020-07-02 RX ORDER — ONDANSETRON 4 MG/1
4 TABLET, ORALLY DISINTEGRATING ORAL EVERY 4 HOURS PRN
Status: DISCONTINUED | OUTPATIENT
Start: 2020-07-02 | End: 2020-07-03 | Stop reason: HOSPADM

## 2020-07-02 RX ORDER — ONDANSETRON 2 MG/ML
4 INJECTION INTRAMUSCULAR; INTRAVENOUS
Status: DISCONTINUED | OUTPATIENT
Start: 2020-07-02 | End: 2020-07-02 | Stop reason: HOSPADM

## 2020-07-02 RX ORDER — OXYBUTYNIN CHLORIDE 10 MG/1
10 TABLET, EXTENDED RELEASE ORAL EVERY EVENING
Status: DISCONTINUED | OUTPATIENT
Start: 2020-07-02 | End: 2020-07-03 | Stop reason: HOSPADM

## 2020-07-02 RX ORDER — OXYCODONE HCL 5 MG/5 ML
5 SOLUTION, ORAL ORAL
Status: DISCONTINUED | OUTPATIENT
Start: 2020-07-02 | End: 2020-07-02 | Stop reason: HOSPADM

## 2020-07-02 RX ORDER — LIDOCAINE HYDROCHLORIDE 20 MG/ML
INJECTION, SOLUTION EPIDURAL; INFILTRATION; INTRACAUDAL; PERINEURAL PRN
Status: DISCONTINUED | OUTPATIENT
Start: 2020-07-02 | End: 2020-07-02 | Stop reason: SURG

## 2020-07-02 RX ORDER — KETOROLAC TROMETHAMINE 30 MG/ML
15 INJECTION, SOLUTION INTRAMUSCULAR; INTRAVENOUS ONCE
Status: COMPLETED | OUTPATIENT
Start: 2020-07-02 | End: 2020-07-02

## 2020-07-02 RX ORDER — HALOPERIDOL 5 MG/ML
1 INJECTION INTRAMUSCULAR
Status: DISCONTINUED | OUTPATIENT
Start: 2020-07-02 | End: 2020-07-02 | Stop reason: HOSPADM

## 2020-07-02 RX ORDER — OXYCODONE HYDROCHLORIDE 10 MG/1
10 TABLET ORAL
Status: DISCONTINUED | OUTPATIENT
Start: 2020-07-02 | End: 2020-07-03 | Stop reason: HOSPADM

## 2020-07-02 RX ADMIN — ONDANSETRON 4 MG: 2 INJECTION INTRAMUSCULAR; INTRAVENOUS at 00:24

## 2020-07-02 RX ADMIN — FENTANYL CITRATE 50 MCG: 50 INJECTION INTRAMUSCULAR; INTRAVENOUS at 21:23

## 2020-07-02 RX ADMIN — SODIUM CHLORIDE: 9 INJECTION, SOLUTION INTRAVENOUS at 11:52

## 2020-07-02 RX ADMIN — MORPHINE SULFATE 4 MG: 4 INJECTION INTRAVENOUS at 00:24

## 2020-07-02 RX ADMIN — SODIUM CHLORIDE: 9 INJECTION, SOLUTION INTRAVENOUS at 23:27

## 2020-07-02 RX ADMIN — FENTANYL CITRATE 50 MCG: 50 INJECTION INTRAMUSCULAR; INTRAVENOUS at 21:39

## 2020-07-02 RX ADMIN — TAMSULOSIN HYDROCHLORIDE 0.4 MG: 0.4 CAPSULE ORAL at 01:10

## 2020-07-02 RX ADMIN — DEXAMETHASONE SODIUM PHOSPHATE 8 MG: 4 INJECTION, SOLUTION INTRA-ARTICULAR; INTRALESIONAL; INTRAMUSCULAR; INTRAVENOUS; SOFT TISSUE at 21:14

## 2020-07-02 RX ADMIN — KETOROLAC TROMETHAMINE 15 MG: 30 INJECTION, SOLUTION INTRAMUSCULAR at 00:24

## 2020-07-02 RX ADMIN — KETOROLAC TROMETHAMINE 30 MG: 30 INJECTION, SOLUTION INTRAMUSCULAR at 23:37

## 2020-07-02 RX ADMIN — FENTANYL CITRATE 50 MCG: 50 INJECTION INTRAMUSCULAR; INTRAVENOUS at 21:46

## 2020-07-02 RX ADMIN — LIDOCAINE HYDROCHLORIDE 80 MG: 20 INJECTION, SOLUTION EPIDURAL; INFILTRATION; INTRACAUDAL at 21:08

## 2020-07-02 RX ADMIN — PHENAZOPYRIDINE HYDROCHLORIDE 200 MG: 100 TABLET ORAL at 23:37

## 2020-07-02 RX ADMIN — SODIUM CHLORIDE: 9 INJECTION, SOLUTION INTRAVENOUS at 03:13

## 2020-07-02 RX ADMIN — FENTANYL CITRATE 50 MCG: 50 INJECTION INTRAMUSCULAR; INTRAVENOUS at 21:06

## 2020-07-02 RX ADMIN — CEFAZOLIN 2 G: 330 INJECTION, POWDER, FOR SOLUTION INTRAMUSCULAR; INTRAVENOUS at 21:08

## 2020-07-02 RX ADMIN — KETOROLAC TROMETHAMINE 30 MG: 30 INJECTION, SOLUTION INTRAMUSCULAR at 15:27

## 2020-07-02 RX ADMIN — OXYBUTYNIN CHLORIDE 10 MG: 10 TABLET, EXTENDED RELEASE ORAL at 23:27

## 2020-07-02 RX ADMIN — PROPOFOL 200 MG: 10 INJECTION, EMULSION INTRAVENOUS at 21:08

## 2020-07-02 ASSESSMENT — LIFESTYLE VARIABLES
DOES PATIENT WANT TO STOP DRINKING: NO
EVER FELT BAD OR GUILTY ABOUT YOUR DRINKING: NO
HOW MANY TIMES IN THE PAST YEAR HAVE YOU HAD 5 OR MORE DRINKS IN A DAY: 5
AVERAGE NUMBER OF DAYS PER WEEK YOU HAVE A DRINK CONTAINING ALCOHOL: 1
EVER_SMOKED: NEVER
ON A TYPICAL DAY WHEN YOU DRINK ALCOHOL HOW MANY DRINKS DO YOU HAVE: 1
HAVE PEOPLE ANNOYED YOU BY CRITICIZING YOUR DRINKING: NO
ALCOHOL_USE: YES
HAVE YOU EVER FELT YOU SHOULD CUT DOWN ON YOUR DRINKING: NO
TOTAL SCORE: 0
EVER HAD A DRINK FIRST THING IN THE MORNING TO STEADY YOUR NERVES TO GET RID OF A HANGOVER: NO
CONSUMPTION TOTAL: POSITIVE
TOTAL SCORE: 0
TOTAL SCORE: 0

## 2020-07-02 ASSESSMENT — ENCOUNTER SYMPTOMS
BLOOD IN STOOL: 0
DIARRHEA: 0
BACK PAIN: 0
ABDOMINAL PAIN: 0
SHORTNESS OF BREATH: 0
MYALGIAS: 0
HEADACHES: 0
NAUSEA: 1
SEIZURES: 0
NECK PAIN: 0
DIZZINESS: 0
FLANK PAIN: 1
SORE THROAT: 0
FOCAL WEAKNESS: 0
CHILLS: 0
FEVER: 0
PALPITATIONS: 0
BRUISES/BLEEDS EASILY: 0
DIAPHORESIS: 0
COUGH: 0
SPUTUM PRODUCTION: 0
BLURRED VISION: 0
WHEEZING: 0
VOMITING: 0

## 2020-07-02 ASSESSMENT — COGNITIVE AND FUNCTIONAL STATUS - GENERAL
DAILY ACTIVITIY SCORE: 24
SUGGESTED CMS G CODE MODIFIER DAILY ACTIVITY: CH
SUGGESTED CMS G CODE MODIFIER MOBILITY: CH
MOBILITY SCORE: 24

## 2020-07-02 ASSESSMENT — COPD QUESTIONNAIRES
DO YOU EVER COUGH UP ANY MUCUS OR PHLEGM?: NO/ONLY WITH OCCASIONAL COLDS OR INFECTIONS
DURING THE PAST 4 WEEKS HOW MUCH DID YOU FEEL SHORT OF BREATH: NONE/LITTLE OF THE TIME
HAVE YOU SMOKED AT LEAST 100 CIGARETTES IN YOUR ENTIRE LIFE: NO/DON'T KNOW
COPD SCREENING SCORE: 0

## 2020-07-02 ASSESSMENT — PATIENT HEALTH QUESTIONNAIRE - PHQ9
SUM OF ALL RESPONSES TO PHQ9 QUESTIONS 1 AND 2: 0
2. FEELING DOWN, DEPRESSED, IRRITABLE, OR HOPELESS: NOT AT ALL
1. LITTLE INTEREST OR PLEASURE IN DOING THINGS: NOT AT ALL

## 2020-07-02 ASSESSMENT — PAIN SCALES - GENERAL: PAIN_LEVEL: 0

## 2020-07-02 NOTE — CARE PLAN
Problem: Safety  Goal: Will remain free from injury  Outcome: PROGRESSING AS EXPECTED   Pt fall risk assessed and deemed a No risk. Pt educated on unit policies. Has reminded free from injury  Problem: Knowledge Deficit  Goal: Knowledge of disease process/condition, treatment plan, diagnostic tests, and medications will improve  Outcome: PROGRESSING AS EXPECTED   Pt educated on POC, dicussed questions and concerns

## 2020-07-02 NOTE — CARE PLAN
Problem: Communication  Goal: The ability to communicate needs accurately and effectively will improve  Outcome: PROGRESSING AS EXPECTED  Intervention: [facility]age patient and significant other/support system to call light to alert staff of needs  Flowsheets (Taken 7/2/2020 6130)  Oriented to:: All of the Following : Location of Bathroom, Visiting Policy, Unit Routine, Call Light and Bedside Controls, Bedside Rail Policy, Smoking Policy, Rights and Responsibilities, Bedside Report, and Patient Education Notebook     Problem: Fluid Volume:  Goal: Will maintain balanced intake and output  Outcome: PROGRESSING AS EXPECTED  Note: Running NS continuous at 150 ml/ hr as ordered. Pt tolerating well.

## 2020-07-02 NOTE — PROGRESS NOTES
Pt arrived from ER. VSS. No c/o pain. NPO. Admit profile complete. Fluids running. No c/o dysuria. Pt resting comfortably.

## 2020-07-02 NOTE — H&P
[facility] Medicine History & Physical Note    Date of Service  7/2/2020    Primary Care Physician  Pcp Pt States None    Code Status  Full Code    Chief Complaint  Chief Complaint   Patient presents with   • Flank Pain     Pt c/o R sided flank sided flank pain. He reports hx of kidney stones x5 in the past. Pt denies hematuria, dysuria or fevers at this time.        History of Presenting Illness  25 y.o. male who presented 7/1/2020 with right flank pain that started yesterday evening.  The patient reported right flank pain with radiation down to his right groin associated with nausea.  He tried taking Motrin and Norco without any relief.  He denies any fevers, chills, chest pain, shortness of breath, dysuria or hematuria.  He has a history of multiple kidney stones.    Review of Systems  Review of Systems   Constitutional: Negative for chills, diaphoresis and fever.   HENT: Negative for hearing loss and sore throat.    Eyes: Negative for blurred vision.   Respiratory: Negative for cough, sputum production, shortness of breath and wheezing.    Cardiovascular: Negative for chest pain, palpitations and leg swelling.   Gastrointestinal: Positive for nausea. Negative for abdominal pain, blood in stool, diarrhea and vomiting.   Genitourinary: Positive for flank pain. Negative for dysuria and urgency.   Musculoskeletal: Negative for back pain, joint pain, myalgias and neck pain.   Skin: Negative for rash.   Neurological: Negative for dizziness, focal weakness, seizures and headaches.   Endo/Heme/Allergies: Does not bruise/bleed easily.   Psychiatric/Behavioral: Negative for suicidal ideas.   All other systems reviewed and are negative.      Past Medical History   has a past medical history of Kidney calculi.    Surgical History  No pertinent surgical history    Family History  No pertinent family history    Social History   reports that he has never smoked. He has never used smokeless tobacco. He reports previous alcohol  use. He reports that he does not use drugs.    Allergies  No Known Allergies    Medications  Prior to Admission Medications   Prescriptions Last Dose Informant Patient Reported? Taking?   ondansetron (ZOFRAN ODT) 4 MG TABLET DISPERSIBLE 7/1/2020 at 2100  No No   Sig: Take 1 Tab by mouth every 8 hours as needed for Nausea.      Facility-Administered Medications: None       Physical Exam  Temp:  [36.9 °C (98.4 °F)] 36.9 °C (98.4 °F)  Pulse:  [55-76] 76  Resp:  [14] 14  BP: (121-149)/(71-89) 121/71  SpO2:  [96 %-100 %] 96 %    Physical Exam  Vitals signs and nursing note reviewed.   Constitutional:       General: He is not in acute distress.     Appearance: Normal appearance.   HENT:      Head: Normocephalic and atraumatic.      Nose: Nose normal.      Mouth/Throat:      Mouth: Mucous membranes are moist.   Eyes:      Extraocular Movements: Extraocular movements intact.      Conjunctiva/sclera: Conjunctivae normal.      Pupils: Pupils are equal, round, and reactive to light.   Neck:      Musculoskeletal: Normal range of motion and neck supple.   Cardiovascular:      Rate and Rhythm: Normal rate and regular rhythm.      Pulses: Normal pulses.      Heart sounds: Normal heart sounds.   Pulmonary:      Effort: Pulmonary effort is normal. No respiratory distress.      Breath sounds: Normal breath sounds. No wheezing, rhonchi or rales.   Abdominal:      General: Bowel sounds are normal. There is no distension.      Palpations: Abdomen is soft.      Tenderness: There is no abdominal tenderness.   Musculoskeletal: Normal range of motion.         General: No swelling or tenderness.   Lymphadenopathy:      Cervical: No cervical adenopathy.   Skin:     General: Skin is warm.      Coloration: Skin is not jaundiced.      Findings: No rash.   Neurological:      General: No focal deficit present.      Mental Status: He is alert and oriented to person, place, and time.      Cranial Nerves: No cranial nerve deficit.      Motor: No  weakness.   Psychiatric:         Mood and Affect: Mood normal.         Behavior: Behavior normal.         Laboratory:  Recent Labs     07/01/20  2348   WBC 10.0   RBC 5.20   HEMOGLOBIN 15.2   HEMATOCRIT 47.0   MCV 90.4   MCH 29.2   MCHC 32.3*   RDW 42.9   PLATELETCT 251   MPV 10.6     Recent Labs     07/01/20  2348   SODIUM 134*   POTASSIUM 3.7   CHLORIDE 99   CO2 22   GLUCOSE 107*   BUN 14   CREATININE 1.35   CALCIUM 9.1     Recent Labs     07/01/20  2348   ALTSGPT 11   ASTSGOT 22   ALKPHOSPHAT 92   TBILIRUBIN 0.6   GLUCOSE 107*         No results for input(s): NTPROBNP in the last 72 hours.      No results for input(s): TROPONINT in the last 72 hours.    Imaging:  CT-RENAL COLIC EVALUATION(A/P W/O)   Final Result      8 mm stone at the RIGHT ureterovesical junction with changes of obstructive uropathy               Assessment/Plan:  I anticipate the patient will require greater than 2 midnights of inpatient medical management      Hydronephrosis with urinary obstruction due to ureteral calculus- (present on admission)  Assessment & Plan  N.p.o.  IV fluid hydration with NS  Flomax  No evidence of infection at this time  Case discussed with urology  Pain control with Tylenol, oxycodone and IV Toradol.  IV morphine for breakthrough, monitor respiratory status closely    Hyponatremia- (present on admission)  Assessment & Plan  Hypovolemic hyponatremia  IV fluid hydration with normal saline  Monitor BMP and assess response

## 2020-07-02 NOTE — ASSESSMENT & PLAN NOTE
N.p.o.  IV fluid hydration with NS  Flomax  No evidence of infection at this time  Case discussed with urology  Pain control with Tylenol, oxycodone and IV Toradol.  IV morphine for breakthrough, monitor respiratory status closely

## 2020-07-02 NOTE — CONSULTS
UROLOGY Consult Note:    Fazal Schmidt P.A.-C.  Date & Time note created:    7/2/2020   9:52 AM     Referring MD:  Dr. Brito    Patient ID:   Name:             Mario Horne     YOB: 1995  Age:                 25 y.o.  male   MRN:               [id number]                                                             Reason for Consult:      Right ureteral stone    History of Present Illness:    24 yo male with 1 prior episode of urolithiasis that passed spontaneously was in his usual great state of health until yesterday when he developed a stabbing sensation in his right flank. The pain was severe and he has several episodes of nausea and vomiting both before and after use of narcotic pain medications and Zofran. He was seen in the ER where he was found to have a distal right ureteral stone and he has since been admitted for symptom management. He denies fevers. He has not had dysuria or hematuria. Urology has been consulted for definitive management.     Review of Systems:      Constitutional: Denies fevers   Eyes: Denies changes in vision   Ears/Nose/Throat/Mouth: Denies nasal congestion   Cardiovascular: no chest pain   Respiratory: no shortness of breath   Gastrointestinal/Hepatic: Denies abdominal pain   Genitourinary: Denies hematuria, dysuria or frequency  Musculoskeletal/Rheum: Denies joint pain   Skin: Denies rash  Neurological: Denies headache   Psychiatric: denies mood disorder   Endocrine: Cassandra thyroid problems  Heme/Oncology/Lymph Nodes: Denies enlarged lymph nodes   All other systems were reviewed and are negative (AMA/CMS criteria)                Past Medical History:   Past Medical History:   Diagnosis Date   • Kidney calculi     x5     Active Hospital Problems    Diagnosis   • Hydronephrosis with urinary obstruction due to ureteral calculus [N13.2]     Priority: High   • Hyponatremia [E87.1]       Past Surgical History:  History reviewed. No pertinent surgical  history.    Hospital Medications:    Current Facility-Administered Medications:   •  senna-docusate (PERICOLACE or SENOKOT S) 8.6-50 MG per tablet 2 Tab, 2 Tab, Oral, BID **AND** polyethylene glycol/lytes (MIRALAX) PACKET 1 Packet, 1 Packet, Oral, QDAY PRN **AND** magnesium hydroxide (MILK OF MAGNESIA) suspension 30 mL, 30 mL, Oral, QDAY PRN **AND** bisacodyl (DULCOLAX) suppository 10 mg, 10 mg, Rectal, QDAY PRN, Dheeraj Gonzalez M.D.  •  Respiratory Therapy Consult, , Nebulization, Continuous RT, Dheeraj Gonzalez M.D.  •  NS infusion, , Intravenous, Continuous, Dheeraj Gonzalez M.D., Last Rate: 150 mL/hr at 07/02/20 0313  •  ondansetron (ZOFRAN) syringe/vial injection 4 mg, 4 mg, Intravenous, Q4HRS PRN, Dheeraj Gonzalez M.D.  •  ondansetron (ZOFRAN ODT) dispertab 4 mg, 4 mg, Oral, Q4HRS PRN, Dheeraj Gonzalez M.D.  •  promethazine (PHENERGAN) tablet 12.5-25 mg, 12.5-25 mg, Oral, Q4HRS PRN, Dheeraj Gonzalez M.D.  •  promethazine (PHENERGAN) suppository 12.5-25 mg, 12.5-25 mg, Rectal, Q4HRS PRN, Dheeraj Gonzalez M.D.  •  prochlorperazine (COMPAZINE) injection 5-10 mg, 5-10 mg, Intravenous, Q4HRS PRN, Dheeraj Gonzalez M.D.  •  acetaminophen (TYLENOL) tablet 650 mg, 650 mg, Oral, Q6HRS PRN, Dheeraj Gonzalez M.D.  •  Notify provider if pain remains uncontrolled, , , CONTINUOUS **AND** Use the numeric rating scale (NRS-11) on regular floors and Critical-Care Pain Observation Tool (CPOT) on ICUs/Trauma to assess pain, , , CONTINUOUS **AND** Pulse Ox (Oximetry), , , CONTINUOUS **AND** Pharmacy Consult Request ...Pain Management Review 1 Each, 1 Each, Other, PHARMACY TO DOSE **AND** If patient difficult to arouse and/or has respiratory depression, stop any opiates that are currently infusing and call a Rapid Response., , , CONTINUOUS **AND** oxyCODONE immediate-release (ROXICODONE) tablet 5 mg, 5 mg, Oral, Q3HRS PRN **AND** oxyCODONE immediate release (ROXICODONE) tablet 10 mg, 10 mg, Oral, Q3HRS PRN **AND** morphine (pf) 4 MG/ML injection 4 mg, 4 mg,  Intravenous, Q3HRS PRN, Dheeraj Gonzalez M.D.  •  ketorolac (TORADOL) injection 30 mg, 30 mg, Intravenous, Q6HRS PRN, Dheeraj Gonzalez M.D.    Current Outpatient Medications:  Medications Prior to Admission   Medication Sig Dispense Refill Last Dose   • ondansetron (ZOFRAN ODT) 4 MG TABLET DISPERSIBLE Take 1 Tab by mouth every 8 hours as needed for Nausea. 8 Tab 0 7/1/2020 at 2100       Medication Allergy:  No Known Allergies    Family History:  History reviewed. No pertinent family history.    Social History:  Social History     Socioeconomic History   • Marital status: Single     Spouse name: Not on file   • Number of children: Not on file   • Years of education: Not on file   • Highest education level: Not on file   Occupational History   • Not on file   Social Needs   • Financial resource strain: Not on file   • Food insecurity     Worry: Not on file     Inability: Not on file   • Transportation needs     Medical: Not on file     Non-medical: Not on file   Tobacco Use   • Smoking status: Never Smoker   • Smokeless tobacco: Never Used   Substance and Sexual Activity   • Alcohol use: Not Currently   • Drug use: Never   • Sexual activity: Not on file   Lifestyle   • Physical activity     Days per week: Not on file     Minutes per session: Not on file   • Stress: Not on file   Relationships   • Social connections     Talks on phone: Not on file     Gets together: Not on file     Attends Taoist service: Not on file     Active member of club or organization: Not on file     Attends meetings of clubs or organizations: Not on file     Relationship status: Not on file   • Intimate partner violence     Fear of current or ex partner: Not on file     Emotionally abused: Not on file     Physically abused: Not on file     Forced sexual activity: Not on file   Other Topics Concern   • Not on file   Social History Narrative   • Not on file         Physical Exam:  Vitals/ General Appearance:   Weight/BMI: Body mass index is 21.82  "kg/m².  BP (!) 98/53   Pulse (!) 58   Temp 36.4 °C (97.5 °F) (Temporal)   Resp 17   Ht 1.905 m (6' 3\")   Wt 79.2 kg (174 lb 9.7 oz)   SpO2 95%   Vitals:    07/02/20 0100 07/02/20 0159 07/02/20 0306 07/02/20 0745   BP:  108/59 118/78 (!) 98/53   Pulse: 76 69 78 (!) 58   Resp:   16 17   Temp:   36.4 °C (97.5 °F) 36.4 °C (97.5 °F)   TempSrc:   Temporal Temporal   SpO2: 96% 98% 96% 95%   Weight:       Height:         Oxygen Therapy:  Pulse Oximetry: 95 %, O2 (LPM): 0, O2 Delivery Device: None - Room Air    Constitutional:   Well developed, Well nourished, No acute distress  HENMT:  Normocephalic, Atraumatic, Oropharynx moist mucous membranes, No oral exudates, Nose normal.  No thyromegaly.  Eyes:  EOMI, Conjunctiva normal, No discharge.  Neck:  Normal range of motion, No cervical tenderness.  Cardiovascular:  Normal heart rate, Normal rhythm, No murmurs, No rubs, No gallops.   Extremitites with intact distal pulses, no cyanosis, or edema.  Lungs:  Normal breath sounds, breath sounds clear to auscultation bilaterally,  no crackles, no wheezing.   Abdomen: Bowel sounds normal, Soft, No tenderness, No guarding, No rebound, No masses, No hepatosplenomegaly.  : right CVAT  Skin: Warm, Dry, No erythema, No rash, no induration.  Neurologic: Alert & oriented x 3, No focal deficits noted.  Psychiatric: Affect normal, Judgment normal, Mood normal.      MDM (Data Review):     Records reviewed and summarized in current documentation    Lab Data Review:  Recent Results (from the past 24 hour(s))   CBC WITH DIFFERENTIAL    Collection Time: 07/01/20 11:48 PM   Result Value Ref Range    WBC 10.0 4.8 - 10.8 K/uL    RBC 5.20 4.70 - 6.10 M/uL    Hemoglobin 15.2 14.0 - 18.0 g/dL    Hematocrit 47.0 42.0 - 52.0 %    MCV 90.4 81.4 - 97.8 fL    MCH 29.2 27.0 - 33.0 pg    MCHC 32.3 (L) 33.7 - 35.3 g/dL    RDW 42.9 35.9 - 50.0 fL    Platelet Count 251 164 - 446 K/uL    MPV 10.6 9.0 - 12.9 fL    Neutrophils-Polys 77.70 (H) 44.00 - 72.00 % "    Lymphocytes 15.20 (L) 22.00 - 41.00 %    Monocytes 5.90 0.00 - 13.40 %    Eosinophils 0.70 0.00 - 6.90 %    Basophils 0.30 0.00 - 1.80 %    Immature Granulocytes 0.20 0.00 - 0.90 %    Nucleated RBC 0.00 /100 WBC    Neutrophils (Absolute) 7.73 (H) 1.82 - 7.42 K/uL    Lymphs (Absolute) 1.51 1.00 - 4.80 K/uL    Monos (Absolute) 0.59 0.00 - 0.85 K/uL    Eos (Absolute) 0.07 0.00 - 0.51 K/uL    Baso (Absolute) 0.03 0.00 - 0.12 K/uL    Immature Granulocytes (abs) 0.02 0.00 - 0.11 K/uL    NRBC (Absolute) 0.00 K/uL   COMP METABOLIC PANEL    Collection Time: 07/01/20 11:48 PM   Result Value Ref Range    Sodium 134 (L) 135 - 145 mmol/L    Potassium 3.7 3.6 - 5.5 mmol/L    Chloride 99 96 - 112 mmol/L    Co2 22 20 - 33 mmol/L    Anion Gap 13.0 7.0 - 16.0    Glucose 107 (H) 65 - 99 mg/dL    Bun 14 8 - 22 mg/dL    Creatinine 1.35 0.50 - 1.40 mg/dL    Calcium 9.1 8.5 - 10.5 mg/dL    AST(SGOT) 22 12 - 45 U/L    ALT(SGPT) 11 2 - 50 U/L    Alkaline Phosphatase 92 30 - 99 U/L    Total Bilirubin 0.6 0.1 - 1.5 mg/dL    Albumin 4.7 3.2 - 4.9 g/dL    Total Protein 7.4 6.0 - 8.2 g/dL    Globulin 2.7 1.9 - 3.5 g/dL    A-G Ratio 1.7 g/dL   PTH WITH IONIZED CALCIUM    Collection Time: 07/01/20 11:48 PM   Result Value Ref Range    Pth, Intact 48.7 14.0 - 72.0 pg/mL    Ionized Calcium 1.1 1.1 - 1.3 mmol/L   ESTIMATED GFR    Collection Time: 07/01/20 11:48 PM   Result Value Ref Range    GFR If African American >60 >60 mL/min/1.73 m 2    GFR If Non African American >60 >60 mL/min/1.73 m 2   URINALYSIS (UA)    Collection Time: 07/02/20 12:23 AM    Specimen: Urine, Clean Catch   Result Value Ref Range    Color Yellow     Character Cloudy (A)     Ph 6.0 5.0 - 8.0    Glucose Negative Negative mg/dL    Ketones Trace (A) Negative mg/dL    Protein 30 (A) Negative mg/dL    Bilirubin Small (A) Negative    Urobilinogen, Urine 0.2 Negative    Nitrite Negative Negative    Leukocyte Esterase Negative Negative    Occult Blood Moderate (A) Negative    Micro  Urine Req Microscopic    REFRACTOMETER SG    Collection Time: 07/02/20 12:23 AM   Result Value Ref Range    Specific Gravity 1.028    URINE MICROSCOPIC (W/UA)    Collection Time: 07/02/20 12:23 AM   Result Value Ref Range    Amorphous Crystal Present /hpf       Imaging/Procedures Review:    Reviewed    MDM (Assessment and Plan):       A 24 yo male with an obstructing distal right ureteral stone. We have discussed his options. Given the low likelihood of spontaneous passage, he elects to proceed with definitive management. I have explained risks of pain, bleeding, infection, perforation and stent bother. Covid testing has been ordered. He will remain NPO and we will arrange right CULTS for this afternoon. Case discussed with RN and Urology-Dr. Walter who has directed this plan of care.

## 2020-07-02 NOTE — ED TRIAGE NOTES
"Chief Complaint   Patient presents with   • Flank Pain     Pt c/o R sided flank sided flank pain. He reports hx of kidney stones x5 in the past. Pt denies hematuria, dysuria or fevers at this time.        /89   Pulse 69   Temp 36.9 °C (98.4 °F) (Temporal)   Resp 14   Ht 1.905 m (6' 3\")   Wt 79.2 kg (174 lb 9.7 oz)   SpO2 99%   BMI 21.82 kg/m²     Pt reports taking 800 ibuprofen and 1 norco at 2000 and vomit at about 2045. He reports a pain level of 6/10. His largest kidney stone was 5mm and he reports he usually passes them on his own, but can't handle the pain.    Pt Informed regarding triage process and verbalized understanding to inform triage tech or RN for any changes in condition.  Placed in lobby.    "

## 2020-07-02 NOTE — PROGRESS NOTES
I saw Mario Horne  History of recurrent kidney stones  Presented with Flank Pain (Pt c/o R sided flank sided flank pain. He reports hx of kidney stones x5 in the past. Pt denies hematuria, dysuria or fevers at this time. )   on 7/1/2020   At the ED afebrile, hemodynamically stable  CT renal:  8 mm stone at the RIGHT ureterovesical junction with changes of obstructive uropathy  No leukocytosis  Urinalysis not indicative of UTI.   Exam benign, pain controlled.  Dr. Abreu, Urology consulted. Plan for intervention today. Screening COVID PCR ordered.

## 2020-07-02 NOTE — ED PROVIDER NOTES
"ED Provider Note    CHIEF COMPLAINT  Chief Complaint   Patient presents with   • Flank Pain     Pt c/o R sided flank sided flank pain. He reports hx of kidney stones x5 in the past. Pt denies hematuria, dysuria or fevers at this time.        HPI  Mario Horne is a 25 y.o. male who presents right flank pain. Medical history notable for 5 kidney stones in the past, high normal calcium level. He states that this is his typical kidney stone pain, started around 7 pm, describes as severe sharp pain that radiates to the groin area. He endorses nausea and vomiting (non-bloody). He denies any fever, chills, hematuria. He took ibuprofen, norco without help. Got zofran due to vomiting but still feeling nauseated.    REVIEW OF SYSTEMS  See HPI for further details. All other systems are negative.     PAST MEDICAL HISTORY   has a past medical history of Kidney calculi.    SOCIAL HISTORY  Social History     Tobacco Use   • Smoking status: Never Smoker   • Smokeless tobacco: Never Used   Substance and Sexual Activity   • Alcohol use: Not Currently   • Drug use: Never   • Sexual activity: Not on file       SURGICAL HISTORY  patient denies any surgical history    CURRENT MEDICATIONS  Home Medications     Reviewed by Francesca Cuellar R.N. (Registered Nurse) on 07/01/20 at 2249  Med List Status: Complete   Medication Last Dose Status   ondansetron (ZOFRAN ODT) 4 MG TABLET DISPERSIBLE 7/1/2020 Active                ALLERGIES  No Known Allergies    PHYSICAL EXAM  VITAL SIGNS: /71   Pulse 76   Temp 36.9 °C (98.4 °F) (Temporal)   Resp 14   Ht 1.905 m (6' 3\")   Wt 79.2 kg (174 lb 9.7 oz)   SpO2 96%   BMI 21.82 kg/m²  @LIMA[[id number]::@   Pulse ox interpretation: I interpret this pulse ox as normal.  Constitutional: Alert in no apparent distress.  HENT: No signs of trauma, Bilateral external ears normal, Nose normal.   Eyes: Pupils are equal and reactive, Conjunctiva normal, Non-icteric.   Neck: Normal range of motion, No " tenderness, Supple, No stridor.   Lymphatic: No lymphadenopathy noted.   Cardiovascular: Regular rate and rhythm, no murmurs.   Thorax & Lungs: Normal breath sounds, No respiratory distress, No wheezing, No chest tenderness.   Abdomen: Bowel sounds normal, Soft, No tenderness, No masses, No pulsatile masses. No peritoneal signs.  Skin: Warm, Dry, No erythema, No rash.   Back: No bony tenderness, + Right CVA tenderness.   Extremities: Intact distal pulses, No edema, No tenderness, No cyanosis,  Negative George's sign.   Musculoskeletal: Good range of motion in all major joints. No tenderness to palpation or major deformities noted.   Neurologic: Alert , Normal motor function, Normal sensory function, No focal deficits noted.   Psychiatric: Affect normal, Judgment normal, Mood normal.       DIAGNOSTIC STUDIES / PROCEDURES    EKG  Not indicated    LABS  UA no LE, or nitrates. No WBC/RBC.  CMP stable kidney functions.  CBC did not reveal elevated WBC.  Ionized Ca is wnl.  PTH wnl.    RADIOLOGY  CT IVP pending.      COURSE & MEDICAL DECISION MAKING  Pertinent Labs & Imaging studies reviewed. (See chart for details)    25 years old male with recurrent kidney stones presents with typical right flank pain. He is not febrile, no tachy, BP stable. CT IVP showing 8 mm stone in UVJ. Although there is a possibility that this stone may pass itself, patient will be admitted to the hospital for observation and urology input if needed.    0023 - STAT Ketorolac IV, morphine, zofran IV ordered UA, CMP, PTH w/ ionized calcium, CT IVP stat.  0035 - tamsulosin 0.4 ordered.  0100 - CT IVP results discussed with the patient.  0133 - consulted to the urologist (Lois)    Disposition: Admit to hospital. Hospitalist team agrees with the admission.    Discussed case with Dr. Abreu on-call for urology.  They will continue with consultation and definitive planning for the morning.  Patient n.p.o. after midnight.    FINAL IMPRESSION  1.  Recurrent kidney stones    2. Ureterolithiasis    - CT IVP - 8 mm R UVJ stone.  - Admit patient.  - UA  - Kidney functions are stable.  - Urology consult.  - Zofran for nausea   - toradol, morphine for pain control.  - Tamsulosin.  - PTH within normal limits.  - ionized Ca wnl.    Electronically signed by: Erlin Brito M.D., 7/2/2020 12:15 AM

## 2020-07-03 VITALS
DIASTOLIC BLOOD PRESSURE: 74 MMHG | WEIGHT: 174.6 LBS | OXYGEN SATURATION: 100 % | HEART RATE: 74 BPM | HEIGHT: 75 IN | SYSTOLIC BLOOD PRESSURE: 109 MMHG | TEMPERATURE: 98.5 F | BODY MASS INDEX: 21.71 KG/M2 | RESPIRATION RATE: 16 BRPM

## 2020-07-03 LAB
ANION GAP SERPL CALC-SCNC: 11 MMOL/L (ref 7–16)
BASOPHILS # BLD AUTO: 0.2 % (ref 0–1.8)
BASOPHILS # BLD: 0.01 K/UL (ref 0–0.12)
BUN SERPL-MCNC: 15 MG/DL (ref 8–22)
CALCIUM SERPL-MCNC: 8.4 MG/DL (ref 8.5–10.5)
CHLORIDE SERPL-SCNC: 107 MMOL/L (ref 96–112)
CO2 SERPL-SCNC: 22 MMOL/L (ref 20–33)
CREAT SERPL-MCNC: 1.05 MG/DL (ref 0.5–1.4)
EOSINOPHIL # BLD AUTO: 0 K/UL (ref 0–0.51)
EOSINOPHIL NFR BLD: 0 % (ref 0–6.9)
ERYTHROCYTE [DISTWIDTH] IN BLOOD BY AUTOMATED COUNT: 41.6 FL (ref 35.9–50)
GLUCOSE SERPL-MCNC: 147 MG/DL (ref 65–99)
HCT VFR BLD AUTO: 42.1 % (ref 42–52)
HGB BLD-MCNC: 13.8 G/DL (ref 14–18)
IMM GRANULOCYTES # BLD AUTO: 0.01 K/UL (ref 0–0.11)
IMM GRANULOCYTES NFR BLD AUTO: 0.2 % (ref 0–0.9)
LYMPHOCYTES # BLD AUTO: 0.6 K/UL (ref 1–4.8)
LYMPHOCYTES NFR BLD: 9.8 % (ref 22–41)
MCH RBC QN AUTO: 29.2 PG (ref 27–33)
MCHC RBC AUTO-ENTMCNC: 32.8 G/DL (ref 33.7–35.3)
MCV RBC AUTO: 89 FL (ref 81.4–97.8)
MONOCYTES # BLD AUTO: 0.06 K/UL (ref 0–0.85)
MONOCYTES NFR BLD AUTO: 1 % (ref 0–13.4)
NEUTROPHILS # BLD AUTO: 5.47 K/UL (ref 1.82–7.42)
NEUTROPHILS NFR BLD: 88.8 % (ref 44–72)
NRBC # BLD AUTO: 0 K/UL
NRBC BLD-RTO: 0 /100 WBC
PATHOLOGY CONSULT NOTE: NORMAL
PLATELET # BLD AUTO: 217 K/UL (ref 164–446)
PMV BLD AUTO: 10.7 FL (ref 9–12.9)
POTASSIUM SERPL-SCNC: 4.4 MMOL/L (ref 3.6–5.5)
RBC # BLD AUTO: 4.73 M/UL (ref 4.7–6.1)
SODIUM SERPL-SCNC: 140 MMOL/L (ref 135–145)
WBC # BLD AUTO: 6.2 K/UL (ref 4.8–10.8)

## 2020-07-03 PROCEDURE — 36415 COLL VENOUS BLD VENIPUNCTURE: CPT

## 2020-07-03 PROCEDURE — 80048 BASIC METABOLIC PNL TOTAL CA: CPT

## 2020-07-03 PROCEDURE — 700105 HCHG RX REV CODE 258: Performed by: INTERNAL MEDICINE

## 2020-07-03 PROCEDURE — A9270 NON-COVERED ITEM OR SERVICE: HCPCS | Performed by: INTERNAL MEDICINE

## 2020-07-03 PROCEDURE — 700102 HCHG RX REV CODE 250 W/ 637 OVERRIDE(OP): Performed by: UROLOGY

## 2020-07-03 PROCEDURE — 700102 HCHG RX REV CODE 250 W/ 637 OVERRIDE(OP): Performed by: INTERNAL MEDICINE

## 2020-07-03 PROCEDURE — 85025 COMPLETE CBC W/AUTO DIFF WBC: CPT

## 2020-07-03 PROCEDURE — 99239 HOSP IP/OBS DSCHRG MGMT >30: CPT | Performed by: INTERNAL MEDICINE

## 2020-07-03 PROCEDURE — A9270 NON-COVERED ITEM OR SERVICE: HCPCS | Performed by: UROLOGY

## 2020-07-03 PROCEDURE — 700111 HCHG RX REV CODE 636 W/ 250 OVERRIDE (IP): Performed by: INTERNAL MEDICINE

## 2020-07-03 RX ORDER — OXYBUTYNIN CHLORIDE 10 MG/1
10 TABLET, EXTENDED RELEASE ORAL EVERY EVENING
Qty: 10 TAB | Refills: 0 | Status: SHIPPED | OUTPATIENT
Start: 2020-07-03 | End: 2020-07-13

## 2020-07-03 RX ORDER — POLYETHYLENE GLYCOL 3350 17 G/17G
17 POWDER, FOR SOLUTION ORAL
Refills: 3 | COMMUNITY
Start: 2020-07-03

## 2020-07-03 RX ORDER — AMOXICILLIN 250 MG
2 CAPSULE ORAL 2 TIMES DAILY PRN
COMMUNITY
Start: 2020-07-03

## 2020-07-03 RX ORDER — ONDANSETRON 4 MG/1
4 TABLET, ORALLY DISINTEGRATING ORAL EVERY 4 HOURS PRN
Qty: 10 TAB | Refills: 0 | Status: SHIPPED | OUTPATIENT
Start: 2020-07-03

## 2020-07-03 RX ORDER — OXYCODONE HYDROCHLORIDE 5 MG/1
5 TABLET ORAL EVERY 8 HOURS PRN
Qty: 9 TAB | Refills: 0 | Status: SHIPPED | OUTPATIENT
Start: 2020-07-03 | End: 2020-07-03 | Stop reason: SDUPTHER

## 2020-07-03 RX ORDER — ACETAMINOPHEN 325 MG/1
650 TABLET ORAL EVERY 6 HOURS PRN
Qty: 30 TAB | Refills: 0 | COMMUNITY
Start: 2020-07-03

## 2020-07-03 RX ORDER — PHENAZOPYRIDINE HYDROCHLORIDE 200 MG/1
100 TABLET, FILM COATED ORAL
Qty: 15 TAB | Refills: 0 | Status: SHIPPED | OUTPATIENT
Start: 2020-07-03 | End: 2020-07-13

## 2020-07-03 RX ORDER — OXYCODONE HYDROCHLORIDE 5 MG/1
5 TABLET ORAL EVERY 8 HOURS PRN
Qty: 9 TAB | Refills: 0 | Status: SHIPPED | OUTPATIENT
Start: 2020-07-03 | End: 2020-07-06

## 2020-07-03 RX ADMIN — PHENAZOPYRIDINE HYDROCHLORIDE 200 MG: 100 TABLET ORAL at 09:45

## 2020-07-03 RX ADMIN — DOCUSATE SODIUM 50 MG AND SENNOSIDES 8.6 MG 2 TABLET: 8.6; 5 TABLET, FILM COATED ORAL at 05:32

## 2020-07-03 RX ADMIN — KETOROLAC TROMETHAMINE 30 MG: 30 INJECTION, SOLUTION INTRAMUSCULAR at 05:36

## 2020-07-03 RX ADMIN — SODIUM CHLORIDE: 9 INJECTION, SOLUTION INTRAVENOUS at 05:32

## 2020-07-03 ASSESSMENT — ENCOUNTER SYMPTOMS
ABDOMINAL PAIN: 0
FEVER: 0

## 2020-07-03 NOTE — OR SURGEON
Immediate Post OP Note    PreOp Diagnosis: R ureteral stone    PostOp Diagnosis: same    Procedure(s):  CYSTOSCOPY, WITH URETERAL STENT INSERTION - Wound Class: Clean Contaminated  URETEROSCOPY - Wound Class: Clean Contaminated  LITHOTRIPSY, USING LASER    Surgeon(s):  Steven Duval M.D.    Anesthesiologist/Type of Anesthesia:  Anesthesiologist: Sherin George M.D./General    Surgical Staff:  Circulator: Michael Adams R.N.  Operating Room Assistant (ORA): Fam Raygoza  Scrub Person: Rena Feng    Specimens removed if any:  R ureteral stone fragments    Estimated Blood Loss: 5ml    Findings: R ureteral stone    Complications: none    Drain:  [id number] R ureteral stent    7/2/2020 10:11 PM Steven Duval M.D.

## 2020-07-03 NOTE — ANESTHESIA TIME REPORT
Anesthesia Start and Stop Event Times     Date Time Event    7/2/2020 2038 Ready for Procedure     2103 Anesthesia Start     2201 Anesthesia Stop        Responsible Staff  07/02/20    Name Role Begin End    Sherin George M.D. Anesth 2103 2201        Preop Diagnosis (Free Text):  Pre-op Diagnosis     RIGHT URETERAL STONE, HYDRONEPHROSIS        Preop Diagnosis (Codes):    Post op Diagnosis  Right ureteral stone      Premium Reason  A. 3PM - 7AM    Comments:

## 2020-07-03 NOTE — ANESTHESIA POSTPROCEDURE EVALUATION
Patient: Mario Horne    Procedure Summary     Date:  07/02/20 Room / Location:  [place] [address] / SURGERY [facility]    Anesthesia Start:  2103 Anesthesia Stop:  2201    Procedures:       CYSTOSCOPY, WITH URETERAL STENT INSERTION (Right Ureter)      URETEROSCOPY (Left Ureter)      LITHOTRIPSY, USING LASER Diagnosis:  (RIGHT UREETERAL STONE, HYDRONEPHROSIS)    Surgeon:  Steven Duval M.D. Responsible Provider:  Sherin George M.D.    Anesthesia Type:  general ASA Status:  1          Final Anesthesia Type: general  Last vitals  BP   Blood Pressure: 126/65    Temp   36.7 °C (98 °F)    Pulse   Pulse: 75   Resp   16    SpO2   100 %      Anesthesia Post Evaluation    Patient location during evaluation: PACU  Patient participation: complete - patient participated  Level of consciousness: awake and alert  Pain score: 0    Airway patency: patent  Anesthetic complications: no  Cardiovascular status: hemodynamically stable  Respiratory status: acceptable  Hydration status: euvolemic    PONV: none           Nurse Pain Score: 0 (NPRS)

## 2020-07-03 NOTE — PROGRESS NOTES
Note to reader: this note follows the APSO format rather than the historical SOAP format. Assessment and plan located at the top of the note for ease of use.    Chief Complaint  25 y.o. year old male here with a right ureteral stone    Assessment/Plan  Interval History   Right ureteral stone s/p CULTS 7/2/20    OK for DC from  standpoint  We will arrange stent removal in office in 1 week      Case discussed with patient, RN, Hospitalist and Urology-Dr. Alejo POLLARD  Patient seen and examined    7/3. POD #1. Doing well. Some pain with urination only. No fevers           Review of Systems  Physical Exam   Review of Systems   Constitutional: Negative for fever.   Gastrointestinal: Negative for abdominal pain.   Genitourinary: Positive for hematuria.     Vitals:    07/02/20 2230 07/02/20 2235 07/02/20 2320 07/03/20 0310   BP:  121/82 105/63 109/74   Pulse: 78 75 (!) 49 74   Resp: 18 16 15 16   Temp:  36.3 °C (97.4 °F) 36.6 °C (97.8 °F) 36.9 °C (98.5 °F)   TempSrc:  Temporal Temporal Temporal   SpO2: 97% 100% 100% 100%   Weight:       Height:         Physical Exam   Constitutional: He is oriented to person, place, and time and well-developed, well-nourished, and in no distress. No distress.   Pulmonary/Chest: Effort normal.   Neurological: He is alert and oriented to person, place, and time.   Skin: Skin is warm and dry.   Psychiatric: Affect and judgment normal.   Nursing note and vitals reviewed.       Hematology Chemistry   Lab Results   Component Value Date/Time    WBC 6.2 07/03/2020 04:28 AM    HEMOGLOBIN 13.8 (L) 07/03/2020 04:28 AM    HEMATOCRIT 42.1 07/03/2020 04:28 AM    PLATELETCT 217 07/03/2020 04:28 AM     Lab Results   Component Value Date/Time    SODIUM 140 07/03/2020 04:28 AM    POTASSIUM 4.4 07/03/2020 04:28 AM    CHLORIDE 107 07/03/2020 04:28 AM    CO2 22 07/03/2020 04:28 AM    GLUCOSE 147 (H) 07/03/2020 04:28 AM    BUN 15 07/03/2020 04:28 AM    CREATININE 1.05 07/03/2020 04:28 AM         Labs not  explicitly included in this progress note were reviewed by the author.   Radiology/imaging not explicitly included in this progress note was reviewed by the author.     Medications reviewed and Labs reviewed

## 2020-07-03 NOTE — PROGRESS NOTES
Bedside report received.  Assessment complete.  A&O x 4. Patient calls appropriately.  Patient ambulates with no assist.   Patient has 0/10 pain. Pain managed with prescribed medications.  Denies N&V. Tolerating regular diet.  + void, + flatus, + BM.  Patient denies SOB.  Patient to be discharged today.  Review plan with of care with patient. Call light and personal belongings with in reach. Hourly rounding in place. All needs met at this time.

## 2020-07-03 NOTE — DISCHARGE SUMMARY
Discharge Summary    CHIEF COMPLAINT ON ADMISSION  Chief Complaint   Patient presents with   • Flank Pain     Pt c/o R sided flank sided flank pain. He reports hx of kidney stones x5 in the past. Pt denies hematuria, dysuria or fevers at this time.        Reason for Admission  Flank pain     Admission Date  7/1/2020    CODE STATUS  Full Code    HPI & HOSPITAL COURSE  This is a 25 y.o. male here with Flank Pain (Pt c/o R sided flank sided flank pain. He reports hx of kidney stones x5 in the past. Pt denies hematuria, dysuria or fevers at this time. )  Please review Dr. Dheeraj Gonzalez M.D. notes for further details of history of present illness, past medical/social/family histories, allergies and medications. Please review Dr. Duval, Urology consultation notes.  History of recurrent kidney stones  Presented with Flank Pain (Pt c/o R sided flank sided flank pain. He reports hx of kidney stones x5 in the past. Pt denies hematuria, dysuria or fevers at this time. )   on 7/1/2020   At the ED afebrile, hemodynamically stable  CT renal:  8 mm stone at the RIGHT ureterovesical junction with changes of obstructive uropathy  No leukocytosis  Urinalysis not indicative of UTI.   Exam benign, pain controlled.  Dr. Abreu, Urology consulted. Plan for intervention today. Screening COVID PCR negative  S/p CYSTOSCOPY, WITH URETERAL STENT INSERTION - Wound Class: Clean Contaminated  URETEROSCOPY - Wound Class: Clean Contaminated  LITHOTRIPSY, USING LASER  By Dr. Duval, 7/2/2020  He tolerated the procedure.  He was cleared by urology for discharge with follow-up. Roxana DOMINGUEZ Urology recommended continuing oxybutin and pyridium.    At discharge date, Mario Horne afebrile and hemodynamically stable.  Mario Horne wanted to be discharged today.    Discharge Physical Exam  General/Constitutional: No acute distress.   Head: Normocephalic, atraumatic  ENT: Oral mucosa is moist. No obvious pharyngeal exudates  Eyes: Pink conjunctiva, no  scleral icterus  Neck: Supple, no lymphadenopathy  Cardiovascular: Normal rate and regular rhythm. S1,2 noted. No murmurs, gallops or rubs.  Pulmonary: Clear to auscultation bilaterally. No wheezes, rales or rhonchi.  Abdominal: Soft, nontender, not distended, bowel sounds normoactive. No guarding or peritoneal signs.  Musculoskeletal: No tenderness to palpation of chest wall.  Neurologic: Alert and oriented. Grossly nonfocal, moving all extremities.  Genitourinary: No gross hematuria  Skin: No obvious rash.  Psychiatric: Pleasant, cooperative.  Vitals Reviewed  Labs Reviewed  Imaging reviewed  Nursing notes reviewed      Imaging  CT-RENAL COLIC EVALUATION(A/P W/O)   Final Result      8 mm stone at the RIGHT ureterovesical junction with changes of obstructive uropathy         DX-CYSTO FLUORO > 1 HOUR    (Results Pending)                  Therefore, he is discharged in good and stable condition to home with close outpatient follow-up.    The patient met 2-midnight criteria for an inpatient stay at the time of discharge.    Discharge Date  7/3/2020    FOLLOW UP ITEMS POST DISCHARGE      DISCHARGE DIAGNOSES  Active Problems:    Hydronephrosis with urinary obstruction due to ureteral calculus POA: Yes    Hyponatremia POA: Yes  Resolved Problems:    * No resolved hospital problems. *      FOLLOW UP  No future appointments.  No follow-up provider specified.  Assign and follow up with primary provider or discharge clinic physician in 1-2 weeks  Follow up with Dr. Duval in 1 week for possible stent removal. Appointment MUST be made.  Return to ER in the event of new or worsening symptoms. Please note importance of compliance and the patient has agreed to proceed with all medical recommendations and follow up plan indicated above. All medications come with benefits and risks. Risks may include permanent injury or death and these risks can be minimized with close reassessment and monitoring. Please make it to your scheduled  follow ups with Pcp [facility], and/or specialists clinic.  In prescribing controlled substances to this patient, I certify that I have obtained and reviewed the medical history of Mario Horne. I have also made a good ricardo effort to obtain applicable records from other providers who have treated the patient and no other records are available at this time.     I have conducted a physical exam and documented it. I have reviewed Mr. Horne’s prescription history as maintained by the [place] Prescription Monitoring Program.   Avoid swimming or driving if on narcotic  Given current pandemic, advise social distancing and proper hygiene/hand washing       I have assessed the patient’s risk for abuse, dependency, and addiction using the validated Opioid Risk Tool available at https://www.mdcalc.com/vxpuys-iwyz-srlb-ort-narcotic-abuse.     Given the above, I believe the benefits of controlled substance therapy outweigh the risks. The reasons for prescribing controlled substances include non-narcotic, oral analgesic alternatives have been inadequate for pain control. Accordingly, I have discussed the risk and benefits, treatment plan, and alternative therapies with the patient.   Given current pandemic, advise social distancing and proper hygiene/hand washing  Avoid swimming or driving if on narcotic      MEDICATIONS ON DISCHARGE     Medication List      ASK your doctor about these medications      Instructions   ondansetron 4 MG Tbdp  Commonly known as:  Zofran ODT   Take 1 Tab by mouth every 8 hours as needed for Nausea.  Dose:  4 mg            Allergies  No Known Allergies    DIET  No orders of the defined types were placed in this encounter.      ACTIVITY  As tolerated    CONSULTATIONS  Urology    PROCEDURES  Ct-renal Colic Evaluation(a/p W/o)    Result Date: 7/2/2020 7/2/2020 12:43 AM HISTORY/REASON FOR EXAM: RIGHT flank pain, history of urolithiasis TECHNIQUE/EXAM DESCRIPTION: CT scan of the abdomen and pelvis  without contrast, 7/2/2020 12:43 AM. Unenhanced helical scanning was obtained from the diaphragmatic domes through the pubic symphysis without intravenous contrast. Low dose optimization technique was utilized for this CT exam including automated exposure control and adjustment of the mA and/or kV according to patient size. COMPARISON: 11/30/2019 FINDINGS: The study is limited due to non-use of intravenous contrast. CT Abdomen: There is no evidence of focal consolidation or pleural effusion seen within the lung bases. The liver is unremarkable in appearance. The gallbladder is unremarkable. The pancreas is unremarkable. The spleen is unremarkable. The adrenal glands are unremarkable. LEFT kidney is unremarkable. There is mild RIGHT hydronephrosis. There is RIGHT hydroureter. There is an 8 mm stone at the RIGHT ureteropelvic junction. The aorta is normal in caliber.  No evidence of retroperitoneal adenopathy. CT Pelvis: There is no evidence of bowel obstruction or inflammatory change. The appendix is visualized and appears normal. There is no evidence of free fluid. No acute or suspicious osseous lesion is seen.     8 mm stone at the RIGHT ureterovesical junction with changes of obstructive uropathy     Please see Dr. Duval's OP NOTE    LABORATORY  Lab Results   Component Value Date    SODIUM 140 07/03/2020    POTASSIUM 4.4 07/03/2020    CHLORIDE 107 07/03/2020    CO2 22 07/03/2020    GLUCOSE 147 (H) 07/03/2020    BUN 15 07/03/2020    CREATININE 1.05 07/03/2020        Lab Results   Component Value Date    WBC 6.2 07/03/2020    HEMOGLOBIN 13.8 (L) 07/03/2020    HEMATOCRIT 42.1 07/03/2020    PLATELETCT 217 07/03/2020        Total time of the discharge process exceeds 35 minutes.

## 2020-07-03 NOTE — DISCHARGE INSTRUCTIONS
Discharge Instructions    Discharged to home by car with relative. Discharged via wheelchair, hospital escort: Yes  Special equipment needed: Not ApplicableYes    Be sure to schedule a follow-up appointment with your primary care doctor or any specialists as instructed.     Discharge Plan:   Diet Plan: Discussed  Activity Level: Discussed  Confirmed Follow up Appointment: Appointment Scheduled  Confirmed Symptoms Management: Discussed  Medication Reconciliation Updated: Yes    I understand that a diet low in cholesterol, fat, and sodium is recommended for good health. Unless I have been given specific instructions below for another diet, I accept this instruction as my diet prescription.   Other diet: Regular    Special Instructions: None    · Is patient discharged on Warfarin / Coumadin?   No     Depression / Suicide Risk    As you are discharged from this [facility] facility, it is important to learn how to keep safe from harming yourself.    Recognize the warning signs:  · Abrupt changes in personality, positive or negative- including increase in energy   · Giving away possessions  · Change in eating patterns- significant weight changes-  positive or negative  · Change in sleeping patterns- unable to sleep or sleeping all the time   · Unwillingness or inability to communicate  · Depression  · Unusual sadness, discouragement and loneliness  · Talk of wanting to die  · Neglect of personal appearance   · Rebelliousness- reckless behavior  · Withdrawal from people/activities they love  · Confusion- inability to concentrate     If you or a loved one observes any of these behaviors or has concerns about self-harm, here's what you can do:  · Talk about it- your feelings and reasons for harming yourself  · Remove any means that you might use to hurt yourself (examples: pills, rope, extension cords, firearm)  · Get professional help from the community (Mental Health, Substance Abuse, psychological counseling)  · Do not  be alone:Call your Safe Contact- someone whom you trust who will be there for you.  · Call your local CRISIS HOTLINE 448-1343 or 689-406-0475  · Call your local Children's Mobile Crisis Response Team [place] (463) 177-8138 or www.[employer]  · Call the toll free National Suicide Prevention Hotlines   · National Suicide Prevention Lifeline 001-665-VRQF (3336)  · [employer] Hope Line Network 800-SUICIDE (003-6136)      Kidney Stones    Kidney stones (urolithiasis) are solid, rock-like deposits that form inside of the organs that make urine (kidneys). A kidney stone may form in a kidney and move into the bladder, where it can cause intense pain and block the flow of urine. Kidney stones are created when high levels of certain minerals are found in the urine. They are usually passed through urination, but in some cases, medical treatment may be needed to remove them.  What are the causes?  Kidney stones may be caused by:  · A condition in which certain glands produce too much parathyroid hormone (primary hyperparathyroidism), which causes too much calcium buildup in the blood.  · Buildup of uric acid crystals in the bladder (hyperuricosuria). Uric acid is a chemical that the body produces when you eat certain foods. It usually exits the body in the urine.  · Narrowing (stricture) of one or both of the tubes that drain urine from the kidneys to the bladder (ureters).  · A kidney blockage that is present at birth (congenital obstruction).  · Past surgery on the kidney or the ureters, such as gastric bypass surgery.  What increases the risk?  The following factors make you more likely to develop kidney stones:  · Having had a kidney stone in the past.  · Having a family history of kidney stones.  · Not drinking enough water.  · Eating a diet that is high in protein, salt (sodium), or sugar.  · Being overweight or obese.  What are the signs or symptoms?  Symptoms of a kidney stone may  include:  · Nausea.  · Vomiting.  · Blood in the urine (hematuria).  · Pain in the side of the abdomen, right below the ribs (flank pain). Pain usually spreads (radiates) to the groin.  · Needing to urinate frequently or urgently.  How is this diagnosed?  This condition may be diagnosed based on:  · Your medical history.  · A physical exam.  · Blood tests.  · Urine tests.  · CT scan.  · Abdominal X-ray.  · A procedure to examine the inside of the bladder (cystoscopy).  How is this treated?  Treatment for kidney stones depends on the size, location, and makeup of the stones. Treatment may involve:  · Analyzing your urine before and after you pass the stone through urination.  · Being monitored at the hospital until you pass the stone through urination.  · Increasing your fluid intake and decreasing the amount of calcium and protein in your diet.  · A procedure to break up kidney stones in the bladder using:  ? A focused beam of light (laser therapy).  ? Shock waves (extracorporeal shock wave lithotripsy).  · Surgery to remove kidney stones. This may be needed if you have severe pain or have stones that block your urinary tract.  Follow these instructions at home:  Eating and drinking  · Drink enough fluid to keep your urine clear or pale yellow. This will help you to pass the kidney stone.  · If directed, change your diet. This may include:  ? Limiting how much sodium you eat.  ? Eating more fruits and vegetables.  ? Limiting how much meat, poultry, fish, and eggs you eat.  · Follow instructions from your health care provider about eating or drinking restrictions.  General instructions  · Collect urine samples as told by your health care provider. You may need to collect a urine sample:  ? 24 hours after you pass the stone.  ? 8-12 weeks after passing the kidney stone, and every 6-12 months after that.  · Strain your urine every time you urinate, for as long as directed. Use the strainer that your health care  provider recommends.  · Do not throw out the kidney stone after passing it. Keep the stone so it can be tested by your health care provider. Testing the makeup of your kidney stone may help prevent you from getting kidney stones in the future.  · Take over-the-counter and prescription medicines only as told by your health care provider.  · Keep all follow-up visits as told by your health care provider. This is important. You may need follow-up X-rays or ultrasounds to make sure that your stone has passed.  How is this prevented?  To prevent another kidney stone:  · Drink enough fluid to keep your urine clear or pale yellow. This is the best way to prevent kidney stones.  · Eat a healthy diet and follow recommendations from your health care provider about foods to avoid. You may be instructed to eat a low-protein diet. Recommendations vary depending on the type of kidney stone that you have.  · Maintain a healthy weight.  Contact a health care provider if:  · You have pain that gets worse or does not get better with medicine.  Get help right away if:  · You have a fever or chills.  · You develop severe pain.  · You develop new abdominal pain.  · You faint.  · You are unable to urinate.  This information is not intended to replace advice given to you by your health care provider. Make sure you discuss any questions you have with your health care provider.  Document Released: 12/18/2006 Document Revised: 07/30/2019 Document Reviewed: 06/02/2017  Elsevier Patient Education © 2020 [employer]

## 2020-07-03 NOTE — PROGRESS NOTES
"Report received from PACU RN.    Pt A&O x 4.     Vitals: /63   Pulse (!) 49   Temp 36.6 °C (97.8 °F) (Temporal)   Resp 15   Ht 1.905 m (6' 3\")   Wt 79.2 kg (174 lb 9.7 oz)   SpO2 100%   BMI 21.82 kg/m²      Pt rates pain 2 out of 10. Declines additional interventions at this time.      Neuro: MARINELLI. Denies new onset of numbness/ tingling.     Cardiac: Denies new onset of chest pain. Intermittent bradycardia.     Vascular: Pulses 2+ BUE, BLE. No edema noted.     Respiratory: Lungs sound clear to auscultation.  On 1L of O2 via nasal cannula.  on, satting in 90's. Denies SOB.     GI: Abdomen soft, tender. Hypoactive bowel sounds, - flatus, - BM. Denies nausea/ vomiting.     : Awaiting post-surgical void.      MSK: Pt up to bathroom with stand by assist, tolerating well.     Integumentary: Skin intact.     Labs noted.     Fall precautions in place: Bed locked in lowest position, Upper bed rails up, treaded socks in place, personal belongings within reach, call light within reach, appropriate mobility signs in place, - bed alarm. Pt calls appropriately.      Pt updated on POC.    "

## 2020-07-03 NOTE — OP REPORT
DATE OF SERVICE:  07/02/2020    PREOPERATIVE DIAGNOSES:  Right ureteral stone and intractable pain.    POSTOPERATIVE DIAGNOSES:  Right ureteral stone and intractable pain.    PROCEDURES PERFORMED:  Cystoscopy, right ureteroscopy with laser lithotripsy   and right ureteral stent placement.    SURGEON:  Steven Duval MD    ANESTHESIOLOGIST:  Sherin George MD    ANESTHESIA:  General.    INDICATIONS FOR PROCEDURE:  The patient is a 25-year-old gentleman with a   history of stones, now with an 8 mm distal right ureteral stone and   intractable pain.    DESCRIPTION OF PROCEDURE:  After obtaining informed consent, the patient was   brought to the operating room.  After the induction of general anesthesia, he   was placed in dorsal lithotomy position and his genitalia were prepped and   draped in sterile fashion.  He received Ancef as antibiotic prophylaxis prior   to starting the procedure.  A 22-Haitian cystoscope was passed per urethra into   the bladder under direct vision.  The bladder was surveyed in its entirety,   was normal in appearance.  A sensor wire was passed up the right ureter under   fluoroscopic guidance and a semirigid ureteroscope was passed up the right   ureter until the stone was encountered.  A 200 micron holmium laser fiber was   used to fragment the stone into small enough fragments to basket out using a   Zero Tip nitinol stone basket.  Stone fragments were sent off for analysis.    The ureteroscope was run up to the proximal ureter with no other stones seen.    There was moderate edema where the stone had been impacted in the distal   ureter.  So I did elect to place a stent at this time.  So I did a retrograde   pyelogram through the ureteroscope to outline the renal pelvis and calices for   stent placement and then passed up a 6-Haitian x 26 cm ureteral stent until it   was in the renal pelvis on fluoroscopy as well as with a good coil in the   bladder on direct vision.  There was good  efflux through the stent.  Patient's   bladder was emptied through the cystoscope, which was then removed.  He was   then awoken from the anesthesia and taken to the recovery room in stable   condition.    COMPLICATIONS:  None.    ESTIMATED BLOOD LOSS:  5 mL.    SPECIMEN:  Right ureteral stone fragments.    DRAIN:  A 6-Guamanian x 26 cm right ureteral stent.       ____________________________________     MD MOHINI Lam / ALFIE    DD:  07/02/2020 22:16:33  DT:  07/02/2020 23:48:22    D#:  [id number]  Job#:  [id number]

## 2020-07-03 NOTE — DISCHARGE PLANNING
Per Chart, Pt is now medically cleared from  standpoint. Will follow and assist with discharge as needed.

## 2020-07-03 NOTE — DISCHARGE PLANNING
This RN CM spoke withRadha of [facility] [place] who said that Pt can call the Business Office so that his Insurance can be updated. Per Pt he has Medicaid now. This RN CM gave Pt contact number at the Business Office.    Per Pt he is discharging today with no needs.

## 2020-07-03 NOTE — CARE PLAN
Problem: Communication  Goal: The ability to communicate needs accurately and effectively will improve  Outcome: PROGRESSING AS EXPECTED  Note: Patient updated on POC, all questions answered at this time.     Problem: Safety  Goal: Will remain free from injury  Outcome: PROGRESSING AS EXPECTED  Note: Bed in locked and lowest position, call light within reach, patient up with stand by assist.

## 2020-07-03 NOTE — ANESTHESIA PREPROCEDURE EVALUATION
Relevant Problems      (+) Hydronephrosis with urinary obstruction due to ureteral calculus       Physical Exam    Airway   Mallampati: II  TM distance: >3 FB  Neck ROM: full       Cardiovascular - normal exam  Rhythm: regular  Rate: normal  (-) murmur     Dental - normal exam           Pulmonary - normal exam  Breath sounds clear to auscultation     Abdominal    Neurological - normal exam                 Anesthesia Plan    ASA 1       Plan - general       Airway plan will be LMA        Induction: intravenous    Postoperative Plan: Postoperative administration of opioids is intended.    Pertinent diagnostic labs and testing reviewed    Informed Consent:    Anesthetic plan and risks discussed with patient.    Use of blood products discussed with: patient whom consented to blood products.

## 2020-07-03 NOTE — ANESTHESIA PROCEDURE NOTES
Airway    Date/Time: 7/2/2020 9:09 PM  Performed by: Sherin George M.D.  Authorized by: Sherin George M.D.     Location:  OR  Urgency:  Elective  Difficult Airway: No    Indications for Airway Management:  Anesthesia      Spontaneous Ventilation: absent    Sedation Level:  Deep  Preoxygenated: Yes    Mask Difficulty Assessment:  0 - not attempted  Final Airway Type:  Supraglottic airway  Final Supraglottic Airway:  Standard LMA    SGA Size:  4  Number of Attempts at Approach:  1

## 2020-07-03 NOTE — PROGRESS NOTES
Patient discharged to home with family and staff escort. IV discontinued. Prescriptions send pharmacy, verbalized understanding, consent signed and in chart. Discharge instructions given regarding kidney stones, and follow up appointments.  Education provided, patient asked questions and verbalized understanding. Discharge paperwork signed and in chart. Patient is tolerating diet, stable when ambulating, and pain is well controlled. All belongings collected. No further questions or concerns at this time.

## 2020-07-08 LAB
APPEARANCE STONE: NORMAL
COMPN STONE: NORMAL
NUMBER STONE: 3
SIZE STONE: NORMAL MM
SPECIMEN WT: 34 MG

## 2020-12-30 DIAGNOSIS — Z23 NEED FOR VACCINATION: ICD-10-CM

## 2021-03-10 ENCOUNTER — HOSPITAL ENCOUNTER (OUTPATIENT)
Dept: LAB | Facility: MEDICAL CENTER | Age: 26
End: 2021-03-10
Attending: INTERNAL MEDICINE
Payer: COMMERCIAL

## 2021-03-10 PROCEDURE — 36415 COLL VENOUS BLD VENIPUNCTURE: CPT

## 2021-03-10 PROCEDURE — 86480 TB TEST CELL IMMUN MEASURE: CPT

## 2021-03-10 PROCEDURE — 86706 HEP B SURFACE ANTIBODY: CPT

## 2021-03-11 LAB — HBV SURFACE AB SERPL IA-ACNC: <3.5 MIU/ML (ref 0–10)

## 2021-05-07 ENCOUNTER — HOSPITAL ENCOUNTER (OUTPATIENT)
Dept: LAB | Facility: MEDICAL CENTER | Age: 26
End: 2021-05-07
Attending: INTERNAL MEDICINE
Payer: COMMERCIAL

## 2021-05-07 PROCEDURE — 86765 RUBEOLA ANTIBODY: CPT

## 2021-05-07 PROCEDURE — 36415 COLL VENOUS BLD VENIPUNCTURE: CPT

## 2021-05-07 PROCEDURE — 86735 MUMPS ANTIBODY: CPT

## 2021-05-07 PROCEDURE — 86762 RUBELLA ANTIBODY: CPT

## 2021-05-08 LAB
MEV IGG SER IA-ACNC: 0.79
MUV IGG SER IA-ACNC: 1.16
RUBV AB SER QL: 22.9 IU/ML

## 2021-05-12 ENCOUNTER — HOSPITAL ENCOUNTER (OUTPATIENT)
Dept: LAB | Facility: MEDICAL CENTER | Age: 26
End: 2021-05-12
Attending: INTERNAL MEDICINE
Payer: COMMERCIAL

## 2021-05-12 PROCEDURE — 36415 COLL VENOUS BLD VENIPUNCTURE: CPT

## 2021-05-12 PROCEDURE — 86480 TB TEST CELL IMMUN MEASURE: CPT

## 2021-05-13 LAB
GAMMA INTERFERON BACKGROUND BLD IA-ACNC: 0.03 IU/ML
M TB IFN-G BLD-IMP: NEGATIVE
M TB IFN-G CD4+ BCKGRND COR BLD-ACNC: 0.01 IU/ML
MITOGEN IGNF BCKGRD COR BLD-ACNC: >10 IU/ML
QFT TB2 - NIL TBQ2: 0 IU/ML

## 2021-12-02 ENCOUNTER — HOSPITAL ENCOUNTER (OUTPATIENT)
Dept: LAB | Facility: MEDICAL CENTER | Age: 26
End: 2021-12-02
Attending: INTERNAL MEDICINE
Payer: COMMERCIAL

## 2021-12-02 LAB — HBV SURFACE AB SERPL IA-ACNC: ABNORMAL MIU/ML (ref 0–10)

## 2021-12-02 PROCEDURE — 86706 HEP B SURFACE ANTIBODY: CPT

## 2021-12-02 PROCEDURE — 36415 COLL VENOUS BLD VENIPUNCTURE: CPT

## 2022-01-20 ENCOUNTER — NON-PROVIDER VISIT (OUTPATIENT)
Dept: OCCUPATIONAL MEDICINE | Facility: CLINIC | Age: 27
End: 2022-01-20

## 2022-01-20 DIAGNOSIS — Z02.89 ENCOUNTER FOR OCCUPATIONAL HEALTH EXAMINATION INVOLVING RESPIRATOR: ICD-10-CM

## 2022-01-20 PROCEDURE — 94375 RESPIRATORY FLOW VOLUME LOOP: CPT | Performed by: NURSE PRACTITIONER

## 2022-06-24 ENCOUNTER — HOSPITAL ENCOUNTER (OUTPATIENT)
Dept: LAB | Facility: MEDICAL CENTER | Age: 27
End: 2022-06-24
Attending: INTERNAL MEDICINE
Payer: MEDICAID

## 2022-06-24 PROCEDURE — 36415 COLL VENOUS BLD VENIPUNCTURE: CPT

## 2022-06-24 PROCEDURE — 86480 TB TEST CELL IMMUN MEASURE: CPT

## 2022-06-27 LAB
GAMMA INTERFERON BACKGROUND BLD IA-ACNC: 0.05 IU/ML
M TB IFN-G BLD-IMP: NEGATIVE
M TB IFN-G CD4+ BCKGRND COR BLD-ACNC: 0 IU/ML
MITOGEN IGNF BCKGRD COR BLD-ACNC: >10 IU/ML
QFT TB2 - NIL TBQ2: -0.01 IU/ML

## 2023-01-20 ENCOUNTER — NON-PROVIDER VISIT (OUTPATIENT)
Dept: OCCUPATIONAL MEDICINE | Facility: CLINIC | Age: 28
End: 2023-01-20

## 2023-01-20 DIAGNOSIS — Z02.89 ENCOUNTER FOR OCCUPATIONAL HEALTH EXAMINATION INVOLVING RESPIRATOR: ICD-10-CM

## 2023-01-20 PROCEDURE — 94375 RESPIRATORY FLOW VOLUME LOOP: CPT | Performed by: NURSE PRACTITIONER

## 2023-06-08 ENCOUNTER — HOSPITAL ENCOUNTER (OUTPATIENT)
Dept: LAB | Facility: MEDICAL CENTER | Age: 28
End: 2023-06-08
Attending: INTERNAL MEDICINE
Payer: MEDICAID

## 2023-06-08 PROCEDURE — 36415 COLL VENOUS BLD VENIPUNCTURE: CPT

## 2023-06-08 PROCEDURE — 86480 TB TEST CELL IMMUN MEASURE: CPT

## 2023-06-10 LAB
GAMMA INTERFERON BACKGROUND BLD IA-ACNC: 0.03 IU/ML
M TB IFN-G BLD-IMP: NEGATIVE
M TB IFN-G CD4+ BCKGRND COR BLD-ACNC: 0 IU/ML
MITOGEN IGNF BCKGRD COR BLD-ACNC: >10 IU/ML
QFT TB2 - NIL TBQ2: 0 IU/ML

## 2023-10-04 PROBLEM — S63.639A SPRAIN OF PROXIMAL INTERPHALANGEAL (PIP) JOINT OF FINGER: Status: ACTIVE | Noted: 2023-10-04

## 2024-02-15 ENCOUNTER — NON-PROVIDER VISIT (OUTPATIENT)
Dept: OCCUPATIONAL MEDICINE | Facility: CLINIC | Age: 29
End: 2024-02-15

## 2024-02-15 DIAGNOSIS — Z02.89 ENCOUNTER FOR OCCUPATIONAL HEALTH EXAMINATION INVOLVING RESPIRATOR: ICD-10-CM

## 2024-02-15 PROCEDURE — 94375 RESPIRATORY FLOW VOLUME LOOP: CPT | Performed by: PREVENTIVE MEDICINE

## 2024-06-05 ENCOUNTER — HOSPITAL ENCOUNTER (OUTPATIENT)
Dept: LAB | Facility: MEDICAL CENTER | Age: 29
End: 2024-06-05
Attending: INTERNAL MEDICINE
Payer: MEDICAID

## 2024-06-05 PROCEDURE — 86480 TB TEST CELL IMMUN MEASURE: CPT

## 2024-06-05 PROCEDURE — 36415 COLL VENOUS BLD VENIPUNCTURE: CPT

## 2024-06-07 LAB
GAMMA INTERFERON BACKGROUND BLD IA-ACNC: 0.04 IU/ML
M TB IFN-G BLD-IMP: NEGATIVE
M TB IFN-G CD4+ BCKGRND COR BLD-ACNC: 0 IU/ML
MITOGEN IGNF BCKGRD COR BLD-ACNC: >10 IU/ML
QFT TB2 - NIL TBQ2: 0.01 IU/ML

## 2024-11-05 ENCOUNTER — PHARMACY VISIT (OUTPATIENT)
Dept: PHARMACY | Facility: MEDICAL CENTER | Age: 29
End: 2024-11-05
Payer: COMMERCIAL

## 2024-11-05 PROCEDURE — RXMED WILLOW AMBULATORY MEDICATION CHARGE: Performed by: INTERNAL MEDICINE

## 2024-11-05 RX ORDER — COVID-19 VACCINE, MRNA 0.04 MG/.418ML
INJECTION, SUSPENSION INTRAMUSCULAR
Qty: 0.3 ML | Refills: 0 | OUTPATIENT
Start: 2024-11-05

## (undated) DEVICE — BLANKET WARMING UPPER BODY - (10/CA)

## (undated) DEVICE — ARMREST CRADLE FOAM - (2PR/PK 12PR/CA)

## (undated) DEVICE — CONTAINER SPECIMEN BAG OR - STERILE 4 OZ W/LID (100EA/CA)

## (undated) DEVICE — SPONGE GAUZESTER 4 X 4 4PLY - (128PK/CA)

## (undated) DEVICE — MASK ANESTHESIA ADULT  - (100/CA)

## (undated) DEVICE — SODIUM CHL. IRRIGATION 0.9% 3000ML (4EA/CA 65CA/PF)

## (undated) DEVICE — KIT ANESTHESIA W/CIRCUIT & 3/LT BAG W/FILTER (20EA/CA)

## (undated) DEVICE — SET EXTENSION WITH 2 PORTS (48EA/CA) ***PART #2C8610 IS A SUBSTITUTE*****

## (undated) DEVICE — BASKET ZERO TIP

## (undated) DEVICE — GLOVE BIOGEL SZ 7.5 SURGICAL PF LTX - (50PR/BX 4BX/CA)

## (undated) DEVICE — SUCTION INSTRUMENT YANKAUER BULBOUS TIP W/O VENT (50EA/CA)

## (undated) DEVICE — GOWN SURGICAL X-LARGE ULTRA - FILM-REINFORCED (20/CA)

## (undated) DEVICE — MASK, LARYNGEAL AIRWAY #4

## (undated) DEVICE — MEDICINE CUP STERILE 2 OZ - (100/CA)

## (undated) DEVICE — WIRE GUIDE SENSOR DUAL FLEX - 5/BX

## (undated) DEVICE — WATER IRRIGATION STERILE 1000ML (12EA/CA)

## (undated) DEVICE — TUBING CLEARLINK DUO-VENT - C-FLO (48EA/CA)

## (undated) DEVICE — GLOVE BIOGEL PI ORTHO SZ 6 1/2 SURGICAL PF LF (40PR/BX)

## (undated) DEVICE — GOWN SURGEONS X-LARGE - DISP. (30/CA)

## (undated) DEVICE — GLOVE BIOGEL PI INDICATOR SZ 6.5 SURGICAL PF LF - (50/BX 4BX/CA)

## (undated) DEVICE — TUBE CONNECT SUCTION CLEAR 120 X 1/4" (50EA/CA)"

## (undated) DEVICE — SENSOR SPO2 NEO LNCS ADHESIVE (20/BX) SEE USER NOTES

## (undated) DEVICE — FIBER LASER MOSES 200 UM (1/EA)

## (undated) DEVICE — SET LEADWIRE 5 LEAD BEDSIDE DISPOSABLE ECG (1SET OF 5/EA)

## (undated) DEVICE — CONNECTOR HOSE NEPTUNE FOR CYSTO ROOM

## (undated) DEVICE — SET IRRIGATION CYSTOSCOPY Y-TYPE L81 IN (20EA/CA)

## (undated) DEVICE — SLEEVE, VASO, THIGH, MED

## (undated) DEVICE — LACTATED RINGERS INJ 1000 ML - (14EA/CA 60CA/PF)

## (undated) DEVICE — JELLY SURGILUBE STERILE TUBE 4.25 OZ (1/EA)

## (undated) DEVICE — HEAD HOLDER JUNIOR/ADULT

## (undated) DEVICE — BAG URODRAIN WITH TUBING - (20/CA)

## (undated) DEVICE — WATER IRRIG. STER 3000 ML - (4/CA)

## (undated) DEVICE — TOWELS CLOTH SURGICAL - (4/PK 20PK/CA)

## (undated) DEVICE — GLOVE BIOGEL SZ 7 SURGICAL PF LTX - (50PR/BX 4BX/CA)

## (undated) DEVICE — COVER FOOT UNIVERSAL DISP. - (25EA/CA)

## (undated) DEVICE — NEPTUNE 4 PORT MANIFOLD - (20/PK)

## (undated) DEVICE — ELECTRODE 850 FOAM ADHESIVE - HYDROGEL RADIOTRNSPRNT (50/PK)